# Patient Record
Sex: FEMALE | Race: WHITE | Employment: FULL TIME | URBAN - METROPOLITAN AREA
[De-identification: names, ages, dates, MRNs, and addresses within clinical notes are randomized per-mention and may not be internally consistent; named-entity substitution may affect disease eponyms.]

---

## 2017-01-08 ENCOUNTER — ED HISTORICAL/CONVERTED ENCOUNTER (OUTPATIENT)
Dept: OTHER | Age: 42
End: 2017-01-08

## 2017-01-25 ENCOUNTER — HOSPITAL ENCOUNTER (EMERGENCY)
Age: 42
Discharge: HOME | End: 2017-01-25
Attending: EMERGENCY MEDICINE
Payer: COMMERCIAL

## 2017-01-25 ENCOUNTER — APPOINTMENT (OUTPATIENT)
Dept: GENERAL RADIOLOGY | Age: 42
End: 2017-01-25
Attending: EMERGENCY MEDICINE
Payer: COMMERCIAL

## 2017-01-25 VITALS
SYSTOLIC BLOOD PRESSURE: 148 MMHG | RESPIRATION RATE: 18 BRPM | DIASTOLIC BLOOD PRESSURE: 81 MMHG | BODY MASS INDEX: 39.99 KG/M2 | OXYGEN SATURATION: 97 % | HEART RATE: 91 BPM | HEIGHT: 65 IN | TEMPERATURE: 97.8 F | WEIGHT: 240 LBS

## 2017-01-25 DIAGNOSIS — J20.8 ACUTE BRONCHITIS DUE TO OTHER SPECIFIED ORGANISMS: Primary | ICD-10-CM

## 2017-01-25 DIAGNOSIS — J01.00 ACUTE MAXILLARY SINUSITIS, RECURRENCE NOT SPECIFIED: ICD-10-CM

## 2017-01-25 PROCEDURE — 71020 XR CHEST PA LAT: CPT

## 2017-01-25 PROCEDURE — 99282 EMERGENCY DEPT VISIT SF MDM: CPT

## 2017-01-25 RX ORDER — PREDNISONE 20 MG/1
60 TABLET ORAL DAILY
Qty: 15 TAB | Refills: 0 | Status: SHIPPED | OUTPATIENT
Start: 2017-01-25 | End: 2017-01-30

## 2017-01-25 RX ORDER — ALBUTEROL SULFATE 90 UG/1
2 AEROSOL, METERED RESPIRATORY (INHALATION)
Qty: 1 INHALER | Refills: 0 | Status: SHIPPED | OUTPATIENT
Start: 2017-01-25 | End: 2019-01-02

## 2017-01-25 RX ORDER — AZITHROMYCIN 250 MG/1
TABLET, FILM COATED ORAL
Qty: 6 TAB | Refills: 0 | Status: SHIPPED | OUTPATIENT
Start: 2017-01-25 | End: 2017-01-30

## 2017-01-25 NOTE — DISCHARGE INSTRUCTIONS
Bronchitis: Care Instructions  Your Care Instructions    Bronchitis is inflammation of the bronchial tubes, which carry air to the lungs. The tubes swell and produce mucus, or phlegm. The mucus and inflamed bronchial tubes make you cough. You may have trouble breathing. Most cases of bronchitis are caused by viruses like those that cause colds. Antibiotics usually do not help and they may be harmful. Bronchitis usually develops rapidly and lasts about 2 to 3 weeks in otherwise healthy people. Follow-up care is a key part of your treatment and safety. Be sure to make and go to all appointments, and call your doctor if you are having problems. It's also a good idea to know your test results and keep a list of the medicines you take. How can you care for yourself at home? · Take all medicines exactly as prescribed. Call your doctor if you think you are having a problem with your medicine. · Get some extra rest.  · Take an over-the-counter pain medicine, such as acetaminophen (Tylenol), ibuprofen (Advil, Motrin), or naproxen (Aleve) to reduce fever and relieve body aches. Read and follow all instructions on the label. · Do not take two or more pain medicines at the same time unless the doctor told you to. Many pain medicines have acetaminophen, which is Tylenol. Too much acetaminophen (Tylenol) can be harmful. · Take an over-the-counter cough medicine that contains dextromethorphan to help quiet a dry, hacking cough so that you can sleep. Avoid cough medicines that have more than one active ingredient. Read and follow all instructions on the label. · Breathe moist air from a humidifier, hot shower, or sink filled with hot water. The heat and moisture will thin mucus so you can cough it out. · Do not smoke. Smoking can make bronchitis worse. If you need help quitting, talk to your doctor about stop-smoking programs and medicines. These can increase your chances of quitting for good.   When should you call for help? Call 911 anytime you think you may need emergency care. For example, call if:  · You have severe trouble breathing. Call your doctor now or seek immediate medical care if:  · You have new or worse trouble breathing. · You cough up dark brown or bloody mucus (sputum). · You have a new or higher fever. · You have a new rash. Watch closely for changes in your health, and be sure to contact your doctor if:  · You cough more deeply or more often, especially if you notice more mucus or a change in the color of your mucus. · You are not getting better as expected. Where can you learn more? Go to http://omkar-thanh.info/. Enter H333 in the search box to learn more about \"Bronchitis: Care Instructions. \"  Current as of: May 23, 2016  Content Version: 11.1  © 5438-0030 Figure 1. Care instructions adapted under license by [employer] (which disclaims liability or warranty for this information). If you have questions about a medical condition or this instruction, always ask your healthcare professional. [address] any warranty or liability for your use of this information. Sinusitis: Care Instructions  Your Care Instructions    Sinusitis is an infection of the lining of the sinus cavities in your head. Sinusitis often follows a cold. It causes pain and pressure in your head and face. In most cases, sinusitis gets better on its own in 1 to 2 weeks. But some mild symptoms may last for several weeks. Sometimes antibiotics are needed. Follow-up care is a key part of your treatment and safety. Be sure to make and go to all appointments, and call your doctor if you are having problems. It's also a good idea to know your test results and keep a list of the medicines you take. How can you care for yourself at home? · Take an over-the-counter pain medicine, such as acetaminophen (Tylenol), ibuprofen (Advil, Motrin), or naproxen (Aleve). Read and follow all instructions on the label. · If the doctor prescribed antibiotics, take them as directed. Do not stop taking them just because you feel better. You need to take the full course of antibiotics. · Be careful when taking over-the-counter cold or flu medicines and Tylenol at the same time. Many of these medicines have acetaminophen, which is Tylenol. Read the labels to make sure that you are not taking more than the recommended dose. Too much acetaminophen (Tylenol) can be harmful. · Breathe warm, moist air from a steamy shower, a hot bath, or a sink filled with hot water. Avoid cold, dry air. Using a humidifier in your home may help. Follow the directions for cleaning the machine. · Use saline (saltwater) nasal washes to help keep your nasal passages open and wash out mucus and bacteria. You can buy saline nose drops at a grocery store or drugstore. Or you can make your own at home by adding 1 teaspoon of salt and 1 teaspoon of baking soda to 2 cups of distilled water. If you make your own, fill a bulb syringe with the solution, insert the tip into your nostril, and squeeze gently. [place] Bees your nose. · Put a hot, wet towel or a warm gel pack on your face 3 or 4 times a day for 5 to 10 minutes each time. · Try a decongestant nasal spray like oxymetazoline (Afrin). Do not use it for more than 3 days in a row. Using it for more than 3 days can make your congestion worse. When should you call for help? Call your doctor now or seek immediate medical care if:  · You have new or worse swelling or redness in your face or around your eyes. · You have a new or higher fever. Watch closely for changes in your health, and be sure to contact your doctor if:  · You have new or worse facial pain. · The mucus from your nose becomes thicker (like pus) or has new blood in it. · You are not getting better as expected. Where can you learn more? Go to http://omkar-thanh.info/.   Enter U877 in the search box to learn more about \"Sinusitis: Care Instructions. \"  Current as of: July 29, 2016  Content Version: 11.1  © 8667-8659 [employer]. Care instructions adapted under license by [employer] (which disclaims liability or warranty for this information). If you have questions about a medical condition or this instruction, always ask your healthcare professional. [address] any warranty or liability for your use of this information. We hope that we have addressed all of your medical concerns. The examination and treatment you received in the Emergency Department were for an emergent problem and were not intended as complete care. It is important that you follow up with your healthcare provider(s) for ongoing care. If your symptoms worsen or do not improve as expected, and you are unable to reach your usual health care provider(s), you should return to the Emergency Department. Today's healthcare is undergoing tremendous change, and patient satisfaction surveys are one of the many tools to assess the quality of medical care. You may receive a survey from the [employer] regarding your experience in the Emergency Department. I hope that your experience has been completely positive, particularly the medical care that I provided. As such, please participate in the survey; anything less than excellent does not meet my expectations or intentions. [facility] and [facility] participate in nationally recognized quality of care measures. If your blood pressure is greater than 120/80, as reported below, we urge that you seek medical care to address the potential of high blood pressure, commonly known as hypertension. Hypertension can be hereditary or can be caused by certain medical conditions, pain, stress, or \"white coat syndrome. \"       Please make an appointment with your health care provider(s) for follow up of your Emergency Department visit. VITALS:   Patient Vitals for the past 8 hrs:   Temp Pulse Resp BP SpO2   01/25/17 1154 97.8 °F (36.6 °C) 91 18 148/81 97 %          Thank you for allowing us to provide you with medical care today. We realize that you have many choices for your emergency care needs. Please choose us in the future for any continued health care needs. Regards,           Lowell ADAMS. [id number] [place], [address]: [phone]            No results found for this or any previous visit (from the past 24 hour(s)). Xr Chest Pa Lat    Result Date: 1/25/2017  Indication: Cough, shortness of breath, headache, congestion for several days. Exam: PA and lateral views of the chest. There is no prior study for direct comparison. Findings: Cardiomediastinal silhouette is within normal limits. Lungs are clear bilaterally. Pleural spaces are normal. Osseous structures are intact. IMPRESSION: No acute cardiopulmonary disease.

## 2017-01-25 NOTE — ED PROVIDER NOTES
HPI Comments: 46 y.o. female with past medical history significant for DM who presents to the ED with chief complaint of cough. Pt reports a productive cough with \"greenish phlegm\" (no blood) onset about 2 days ago accompanied by nasal congestion, sinus pain, ear pain, SOB, and wheezing, says it \"hurts to cough and to breathe. \" Pt states she had similar sx about 2 weeks ago that improved and then returned and worsened. Pt denies hx of asthma, COPD, or emphysema. Pt states she has used inhalers before. Pt denies any chance of pregnancy. There are no other acute medical complaints voiced at this time. Social Hx: Smoker. PCP: None    Note written by Bre Guillen, as dictated by Margo Mcgovern MD 12:23 PM     The history is provided by the patient. Past Medical History:   Diagnosis Date    Diabetes [facility])        Past Surgical History:   Procedure Laterality Date    Hx cholecystectomy      Hx knee arthroscopy Left     Hx bunionectomy Bilateral     Hx tubal ligation           No family history on file. Social History     Social History    Marital status: SINGLE     Spouse name: N/A    Number of children: N/A    Years of education: N/A     Occupational History    Not on file. Social History Main Topics    Smoking status: Current Every Day Smoker     Packs/day: 0.50    Smokeless tobacco: Not on file    Alcohol use No    Drug use: Not on file    Sexual activity: Not on file     Other Topics Concern    Not on file     Social History Narrative         ALLERGIES: Latex; Pcn [penicillins]; and Sulfa (sulfonamide antibiotics)    Review of Systems   Constitutional: Negative. Negative for appetite change, fever and unexpected weight change. HENT: Positive for congestion (nasal), ear pain and sinus pressure. Negative for hearing loss, nosebleeds, rhinorrhea, sore throat and trouble swallowing. Respiratory: Positive for cough (productive), shortness of breath and wheezing. Negative for chest tightness. Cardiovascular: Negative. Negative for chest pain and palpitations. Gastrointestinal: Negative. Negative for abdominal distention, abdominal pain, blood in stool and vomiting. Endocrine: Negative. Genitourinary: Negative for dysuria and hematuria. Musculoskeletal: Negative. Negative for back pain and myalgias. Skin: Negative. Negative for rash. Allergic/Immunologic: Negative. Neurological: Negative. Negative for dizziness, syncope, weakness and numbness. Hematological: Negative. Psychiatric/Behavioral: Negative. All other systems reviewed and are negative. Vitals:    01/25/17 1154   BP: 148/81   Pulse: 91   Resp: 18   Temp: 97.8 °F (36.6 °C)   SpO2: 97%   Weight: 108.9 kg (240 lb)   Height: 5' 5\" (1.651 m)            Physical Exam   Constitutional: She is oriented to person, place, and time. She appears well-developed and well-nourished. No distress. Nontoxic appearing. HENT:   Head: Atraumatic. Right Ear: External ear normal.   Left Ear: External ear normal.   Mouth/Throat: Oropharynx is clear and moist.   Nasal congestion. Sinus discomfort to palpation. Ears normal.  Throat normal.   Eyes: Conjunctivae and EOM are normal. Pupils are equal, round, and reactive to light. Neck: Normal range of motion. Neck supple. No JVD present. No thyromegaly present. Cardiovascular: Normal rate, regular rhythm, normal heart sounds and intact distal pulses. No murmur heard. Pulmonary/Chest: Effort normal. No respiratory distress. She has wheezes. She has no rales. Frequent cough. Coarse breath sounds bilaterally with no rales. Occasional expiratory wheezes. No rhonchi. Abdominal: Soft. Bowel sounds are normal. She exhibits no distension. There is no tenderness. Musculoskeletal: Normal range of motion. She exhibits no edema. Neurological: She is alert and oriented to person, place, and time. No cranial nerve deficit.    Skin: Skin is warm and dry. No rash noted. Psychiatric: She has a normal mood and affect. Her behavior is normal. Thought content normal.   Nursing note and vitals reviewed. Note written by Bre Valle, as dictated by Rishabh Araujo MD 12:24 PM    [facility]  ED Course       Procedures    PROGRESS NOTE:  1:22 PM   Chest x-ray negative. Will discharge home with inhaler, Z pack, and prednisone for bronchitis.

## 2017-04-22 ENCOUNTER — ED HISTORICAL/CONVERTED ENCOUNTER (OUTPATIENT)
Dept: OTHER | Age: 42
End: 2017-04-22

## 2017-08-28 ENCOUNTER — OP HISTORICAL/CONVERTED ENCOUNTER (OUTPATIENT)
Dept: OTHER | Age: 42
End: 2017-08-28

## 2017-10-10 ENCOUNTER — OP HISTORICAL/CONVERTED ENCOUNTER (OUTPATIENT)
Dept: OTHER | Age: 42
End: 2017-10-10

## 2017-11-14 ENCOUNTER — OP HISTORICAL/CONVERTED ENCOUNTER (OUTPATIENT)
Dept: OTHER | Age: 42
End: 2017-11-14

## 2017-12-02 ENCOUNTER — IP HISTORICAL/CONVERTED ENCOUNTER (OUTPATIENT)
Dept: OTHER | Age: 42
End: 2017-12-02

## 2018-03-21 ENCOUNTER — ED HISTORICAL/CONVERTED ENCOUNTER (OUTPATIENT)
Dept: OTHER | Age: 43
End: 2018-03-21

## 2018-11-07 ENCOUNTER — OFFICE VISIT (OUTPATIENT)
Dept: NEUROLOGY | Age: 43
End: 2018-11-07

## 2018-11-07 VITALS
WEIGHT: 243.8 LBS | DIASTOLIC BLOOD PRESSURE: 80 MMHG | SYSTOLIC BLOOD PRESSURE: 140 MMHG | HEIGHT: 65 IN | OXYGEN SATURATION: 98 % | BODY MASS INDEX: 40.62 KG/M2 | HEART RATE: 72 BPM

## 2018-11-07 DIAGNOSIS — G43.709 CHRONIC MIGRAINE W/O AURA W/O STATUS MIGRAINOSUS, NOT INTRACTABLE: Primary | ICD-10-CM

## 2018-11-07 DIAGNOSIS — H53.453 DECREASED PERIPHERAL VISION OF BOTH EYES: ICD-10-CM

## 2018-11-07 RX ORDER — METOPROLOL TARTRATE 25 MG/1
TABLET, FILM COATED ORAL 2 TIMES DAILY
COMMUNITY

## 2018-11-07 RX ORDER — TOPIRAMATE 50 MG/1
TABLET, FILM COATED ORAL
Qty: 60 TAB | Refills: 2 | Status: SHIPPED | OUTPATIENT
Start: 2018-11-07 | End: 2019-02-14 | Stop reason: SDUPTHER

## 2018-11-07 RX ORDER — ATORVASTATIN CALCIUM 40 MG/1
TABLET, FILM COATED ORAL DAILY
COMMUNITY

## 2018-11-07 RX ORDER — TOPIRAMATE 25 MG/1
TABLET ORAL
COMMUNITY
End: 2018-11-07 | Stop reason: ALTCHOICE

## 2018-11-07 RX ORDER — FUROSEMIDE 20 MG/1
TABLET ORAL DAILY
COMMUNITY

## 2018-11-07 RX ORDER — ESCITALOPRAM OXALATE 10 MG/1
10 TABLET ORAL DAILY
COMMUNITY
End: 2018-11-07 | Stop reason: SDUPTHER

## 2018-11-07 RX ORDER — LISINOPRIL 10 MG/1
20 TABLET ORAL DAILY
COMMUNITY

## 2018-11-07 RX ORDER — ESCITALOPRAM OXALATE 20 MG/1
20 TABLET ORAL DAILY
Qty: 90 TAB | Refills: 1 | Status: SHIPPED | OUTPATIENT
Start: 2018-11-07 | End: 2019-06-05 | Stop reason: SDUPTHER

## 2018-11-07 NOTE — PATIENT INSTRUCTIONS
A Healthy Lifestyle: Care Instructions  Your Care Instructions    A healthy lifestyle can help you feel good, stay at a healthy weight, and have plenty of energy for both work and play. A healthy lifestyle is something you can share with your whole family. A healthy lifestyle also can lower your risk for serious health problems, such as high blood pressure, heart disease, and diabetes. You can follow a few steps listed below to improve your health and the health of your family. Follow-up care is a key part of your treatment and safety. Be sure to make and go to all appointments, and call your doctor if you are having problems. It's also a good idea to know your test results and keep a list of the medicines you take. How can you care for yourself at home? · Do not eat too much sugar, fat, or fast foods. You can still have dessert and treats now and then. The goal is moderation. · Start small to improve your eating habits. Pay attention to portion sizes, drink less juice and soda pop, and eat more fruits and vegetables. ? Eat a healthy amount of food. A 3-ounce serving of meat, for example, is about the size of a deck of cards. Fill the rest of your plate with vegetables and whole grains. ? Limit the amount of soda and sports drinks you have every day. Drink more water when you are thirsty. ? Eat at least 5 servings of fruits and vegetables every day. It may seem like a lot, but it is not hard to reach this goal. A serving or helping is 1 piece of fruit, 1 cup of vegetables, or 2 cups of leafy, raw vegetables. Have an apple or some carrot sticks as an afternoon snack instead of a candy bar. Try to have fruits and/or vegetables at every meal.  · Make exercise part of your daily routine. You may want to start with simple activities, such as walking, bicycling, or slow swimming. Try to be active 30 to 60 minutes every day. You do not need to do all 30 to 60 minutes all at once.  For example, you can exercise 3 times a day for 10 or 20 minutes. Moderate exercise is safe for most people, but it is always a good idea to talk to your doctor before starting an exercise program.  · Keep moving. [place] the lawn, work in the garden, or [employer]. Take the stairs instead of the elevator at work. · If you smoke, quit. People who smoke have an increased risk for heart attack, stroke, cancer, and other lung illnesses. Quitting is hard, but there are ways to boost your chance of quitting tobacco for good. ? Use nicotine gum, patches, or lozenges. ? Ask your doctor about stop-smoking programs and medicines. ? Keep trying. In addition to reducing your risk of diseases in the future, you will notice some benefits soon after you stop using tobacco. If you have shortness of breath or asthma symptoms, they will likely get better within a few weeks after you quit. · Limit how much alcohol you drink. Moderate amounts of alcohol (up to 2 drinks a day for men, 1 drink a day for women) are okay. But drinking too much can lead to liver problems, high blood pressure, and other health problems. Family health  If you have a family, there are many things you can do together to improve your health. · Eat meals together as a family as often as possible. · Eat healthy foods. This includes fruits, vegetables, lean meats and dairy, and whole grains. · Include your family in your fitness plan. Most people think of activities such as jogging or tennis as the way to fitness, but there are many ways you and your family can be more active. Anything that makes you breathe hard and gets your heart pumping is exercise. Here are some tips:  ? Walk to do errands or to take your child to school or the bus.  ? Go for a family bike ride after dinner instead of watching TV. Where can you learn more? Go to http://omkar-thanh.info/. Enter M580 in the search box to learn more about \"A Healthy Lifestyle: Care Instructions. \"  Current as of: December 7, 2017  Content Version: 11.8  © 4252-3535 [employer]. Care instructions adapted under license by [employer] (which disclaims liability or warranty for this information). If you have questions about a medical condition or this instruction, always ask your healthcare professional. [address] any warranty or liability for your use of this information.

## 2018-11-07 NOTE — LETTER
11/7/2018 Patient:  Niurka Chaevz YOB: 1975 Date of Visit: 11/7/2018 Dear Niraj Xiao NP 
[id number] [facility] [id number] VIA Facsimile: [phone] 
 : 
 
 
I was requested by MARIA C Hughes to evaluate Ms. Niurka Chavez  for Chief Complaint Patient presents with  Neurologic Problem  Migraine Dana Salguero I am recommending the following:  
 
Diagnoses and all orders for this visit: 
 
1. Chronic migraine w/o aura w/o status migrainosus, not intractable 2. Decreased peripheral vision of both eyes Other orders 
-     fremanezumab-vfrm 225 mg/1.5 mL syrg; 1 Syringe by SubCUTAneous route every month. -     topiramate (TOPAMAX) 50 mg tablet; 1/2 tab twice daily for 3 days then increase to 1 tab twice daily thereafter 
-     escitalopram oxalate (LEXAPRO) 20 mg tablet; Take 1 Tab by mouth daily. 
 
 
 
---------------------------------------------------------------------------------------------------------------------- Below is my encounter: Chief Complaint Patient presents with  Neurologic Problem  Migraine Referred by: MARIA C WILSON Shantal is a 82-year-old woman who has a history of migraine headaches and hypertension. She has had migraine headaches ever since age 18 which in the past couple years has become a daily event described as either right or left-sided slightly more on the left pulsing throbbing pain with light and sound sensitivity and worse with movement. When she has severe escalation she has nausea and vomiting. The other reason why she is here is about 3 weeks ago she had sudden changes in her vision bilaterally described as depth changes possibly some hypersensitivity to light. She went to see ophthalmology whose notes I reviewed and she had normal optic discs but they were concerned about a left homonymous hemianopsia, but I do not have any VF tests to review.  She tells me she had MRI brain done last week or so does not know the results. She still has a headache every day. No unusual numbness or weakness. Depression is a concern. She is on Lexapro for depression. She has EL but is not tolerating the NC. Review of Systems Constitutional: Positive for malaise/fatigue. Eyes: Positive for blurred vision. Negative for double vision. Gastrointestinal: Positive for nausea and vomiting. Neurological: Positive for headaches. Negative for sensory change and speech change. Psychiatric/Behavioral: Positive for memory loss. All other systems reviewed and are negative. Past Medical History:  
Diagnosis Date  Headache  Hypertension Family History Problem Relation Age of Onset  Cancer Mother Social History Socioeconomic History  Marital status:  Spouse name: Not on file  Number of children: Not on file  Years of education: Not on file  Highest education level: Not on file Social Needs  Financial resource strain: Not on file  Food insecurity - worry: Not on file  Food insecurity - inability: Not on file  Transportation needs - medical: Not on file  Transportation needs - non-medical: Not on file Occupational History  Not on file Tobacco Use  Smoking status: Current Every Day Smoker  Smokeless tobacco: Never Used Substance and Sexual Activity  Alcohol use: No  
  Frequency: Never  Drug use: Not on file  Sexual activity: Not on file Other Topics Concern  Not on file Social History Narrative  Not on file Current Outpatient Medications Medication Sig  furosemide (LASIX) 20 mg tablet Take  by mouth daily.  lisinopril (PRINIVIL, ZESTRIL) 10 mg tablet Take  by mouth daily.  metoprolol tartrate (LOPRESSOR) 25 mg tablet Take  by mouth two (2) times a day.  atorvastatin (LIPITOR) 40 mg tablet Take  by mouth daily.   
 fremanezumab-vfrm 225 mg/1.5 mL syrg 1 Syringe by SubCUTAneous route every month.  topiramate (TOPAMAX) 50 mg tablet 1/2 tab twice daily for 3 days then increase to 1 tab twice daily thereafter  escitalopram oxalate (LEXAPRO) 20 mg tablet Take 1 Tab by mouth daily. No current facility-administered medications for this visit. Allergies Allergen Reactions  Penicillins Unknown (comments)  Sulfa (Sulfonamide Antibiotics) Unknown (comments) Neurologic Exam  
 
Mental Status Oriented to person, place, and time. Cranial Nerves Cranial nerves II through XII intact. Questionable imp VF BL periperhally, tunnel like Motor Exam  
Muscle bulk: normal 
 
Strength Strength 5/5 throughout. Sensory Exam  
Light touch normal.  
 
Gait, Coordination, and Reflexes Gait Gait: normal 
 
Coordination Romberg: negative Tremor Resting tremor: absent Reflexes Right brachioradialis: 2+ Left brachioradialis: 2+ Right biceps: 2+ Left biceps: 2+ Right triceps: 2+ Left triceps: 2+ Right patellar: 2+ Left patellar: 2+ Right achilles: 2+ Left achilles: 2+ Physical Exam  
Constitutional: She is oriented to person, place, and time. She appears well-developed and well-nourished. Cardiovascular: Normal rate. Pulmonary/Chest: Effort normal.  
Neurological: She is oriented to person, place, and time. She has normal strength. She has a normal Romberg Test. Gait normal.  
Reflex Scores: 
     Tricep reflexes are 2+ on the right side and 2+ on the left side. Bicep reflexes are 2+ on the right side and 2+ on the left side. Brachioradialis reflexes are 2+ on the right side and 2+ on the left side. Patellar reflexes are 2+ on the right side and 2+ on the left side. Achilles reflexes are 2+ on the right side and 2+ on the left side. Skin: Skin is warm and dry. Psychiatric:  
Very flat affect Vitals reviewed. Visit Vitals /80 Pulse 72 Ht 5' 5\" (1.651 m) Wt 110.6 kg (243 lb 12.8 oz) SpO2 98% BMI 40.57 kg/m² No labs to review Assessment and Plan Diagnoses and all orders for this visit: 
 
1. Chronic migraine w/o aura w/o status migrainosus, not intractable 2. Decreased peripheral vision of both eyes Other orders 
-     fremanezumab-vfrm 225 mg/1.5 mL syrg; 1 Syringe by SubCUTAneous route every month. -     topiramate (TOPAMAX) 50 mg tablet; 1/2 tab twice daily for 3 days then increase to 1 tab twice daily thereafter 
-     escitalopram oxalate (LEXAPRO) 20 mg tablet; Take 1 Tab by mouth daily. 46-year-old woman who has chronic migraine. She has daily migraine headache for the past 2 years or longer. She is also suffering from a lot of concomitant depression. The concern for vision loss is vague. No clear field cuts but she does complain of peripheral loss. Will try to obtain the MRI results. She has already been seen by ophthalmology who found normal optic discs. At this point I would like to augment TPM to a more effective 50 mg twice a day. A trial of Ajovy starting today. I want to increase Lexapro to a more therapeutic 20 mg to target headache and depression. She is not suicidal. Try to be more compliant with EL Rx. I would like her to followup in about 8 weeks with a nurse practitioner for a medication benefit assessment. Thank you for giving me the opportunity to assist in the care of Ms. Kojo Fleming. If you have questions, please do not hesitate to contact me. Sincerely, [address] [facility], DO Neurologist 
[place] RAMOS

## 2018-11-07 NOTE — PROGRESS NOTES
Chief Complaint   Patient presents with    Neurologic Problem    Migraine       Referred by: MARIA C Bala Saavedra is a 46-year-old woman who has a history of migraine headaches and hypertension. She has had migraine headaches ever since age 18 which in the past couple years has become a daily event described as either right or left-sided slightly more on the left pulsing throbbing pain with light and sound sensitivity and worse with movement. When she has severe escalation she has nausea and vomiting. The other reason why she is here is about 3 weeks ago she had sudden changes in her vision bilaterally described as depth changes possibly some hypersensitivity to light. She went to see ophthalmology whose notes I reviewed and she had normal optic discs but they were concerned about a left homonymous hemianopsia, but I do not have any VF tests to review. She tells me she had MRI brain done last week or so does not know the results. She still has a headache every day. No unusual numbness or weakness. Depression is a concern. She is on Lexapro for depression. She has EL but is not tolerating the NC. Review of Systems   Constitutional: Positive for malaise/fatigue. Eyes: Positive for blurred vision. Negative for double vision. Gastrointestinal: Positive for nausea and vomiting. Neurological: Positive for headaches. Negative for sensory change and speech change. Psychiatric/Behavioral: Positive for memory loss. All other systems reviewed and are negative.       Past Medical History:   Diagnosis Date    Headache     Hypertension      Family History   Problem Relation Age of Onset    Cancer Mother      Social History     Socioeconomic History    Marital status:      Spouse name: Not on file    Number of children: Not on file    Years of education: Not on file    Highest education level: Not on file   Social Needs    Financial resource strain: Not on file    Food insecurity - worry: Not on file    Food insecurity - inability: Not on file    Transportation needs - medical: Not on file   [employer] needs - non-medical: Not on file   Occupational History    Not on file   Tobacco Use    Smoking status: Current Every Day Smoker    Smokeless tobacco: Never Used   Substance and Sexual Activity    Alcohol use: No     Frequency: Never    Drug use: Not on file    Sexual activity: Not on file   Other Topics Concern    Not on file   Social History Narrative    Not on file     Current Outpatient Medications   Medication Sig    furosemide (LASIX) 20 mg tablet Take  by mouth daily.  lisinopril (PRINIVIL, ZESTRIL) 10 mg tablet Take  by mouth daily.  metoprolol tartrate (LOPRESSOR) 25 mg tablet Take  by mouth two (2) times a day.  atorvastatin (LIPITOR) 40 mg tablet Take  by mouth daily.  fremanezumab-vfrm 225 mg/1.5 mL syrg 1 Syringe by SubCUTAneous route every month.  topiramate (TOPAMAX) 50 mg tablet 1/2 tab twice daily for 3 days then increase to 1 tab twice daily thereafter    escitalopram oxalate (LEXAPRO) 20 mg tablet Take 1 Tab by mouth daily. No current facility-administered medications for this visit. Allergies   Allergen Reactions    Penicillins Unknown (comments)    Sulfa (Sulfonamide Antibiotics) Unknown (comments)         Neurologic Exam     Mental Status   Oriented to person, place, and time. Cranial Nerves   Cranial nerves II through XII intact. Questionable imp VF BL periperhally, tunnel like     Motor Exam   Muscle bulk: normal    Strength   Strength 5/5 throughout.      Sensory Exam   Light touch normal.     Gait, Coordination, and Reflexes     Gait  Gait: normal    Coordination   Romberg: negative    Tremor   Resting tremor: absent    Reflexes   Right brachioradialis: 2+  Left brachioradialis: 2+  Right biceps: 2+  Left biceps: 2+  Right triceps: 2+  Left triceps: 2+  Right patellar: 2+  Left patellar: 2+  Right achilles: 2+  Left achilles: 2+    Physical Exam   Constitutional: She is oriented to person, place, and time. She appears well-developed and well-nourished. Cardiovascular: Normal rate. Pulmonary/Chest: Effort normal.   Neurological: She is oriented to person, place, and time. She has normal strength. She has a normal Romberg Test. Gait normal.   Reflex Scores:       Tricep reflexes are 2+ on the right side and 2+ on the left side. Bicep reflexes are 2+ on the right side and 2+ on the left side. Brachioradialis reflexes are 2+ on the right side and 2+ on the left side. Patellar reflexes are 2+ on the right side and 2+ on the left side. Achilles reflexes are 2+ on the right side and 2+ on the left side. Skin: Skin is warm and dry. Psychiatric:   Very flat affect   Vitals reviewed. Visit Vitals  /80   Pulse 72   Ht 5' 5\" (1.651 m)   Wt 110.6 kg (243 lb 12.8 oz)   SpO2 98%   BMI 40.57 kg/m²       No labs to review       Assessment and Plan   Diagnoses and all orders for this visit:    1. Chronic migraine w/o aura w/o status migrainosus, not intractable    2. Decreased peripheral vision of both eyes    Other orders  -     fremanezumab-vfrm 225 mg/1.5 mL syrg; 1 Syringe by SubCUTAneous route every month. -     topiramate (TOPAMAX) 50 mg tablet; 1/2 tab twice daily for 3 days then increase to 1 tab twice daily thereafter  -     escitalopram oxalate (LEXAPRO) 20 mg tablet; Take 1 Tab by mouth daily. 44-year-old woman who has chronic migraine. She has daily migraine headache for the past 2 years or longer. She is also suffering from a lot of concomitant depression. The concern for vision loss is vague. No clear field cuts but she does complain of peripheral loss. Will try to obtain the MRI results. She has already been seen by ophthalmology who found normal optic discs. At this point I would like to augment TPM to a more effective 50 mg twice a day. A trial of Ajovy starting today.  I want to increase Lexapro to a more therapeutic 20 mg to target headache and depression. She is not suicidal. Try to be more compliant with EL Rx. I would like her to followup in about 8 weeks with a nurse practitioner for a medication benefit assessment. A notice of this visit/encounter being completed has been sent electronically to the patient's PCP and/or referring provider.      [address] [place] [address], [facility] [address]  Diplomate DALEN

## 2018-11-07 NOTE — PROGRESS NOTES
Ms. Jess Jacinto presents as a new patient for evaluation of headaches and decreased vision in both eyes. Her headaches are daily. Depression screening done on patient.

## 2018-11-15 DIAGNOSIS — I69.30 CHRONIC ISCHEMIC RIGHT PCA STROKE: Primary | ICD-10-CM

## 2018-11-19 ENCOUNTER — TELEPHONE (OUTPATIENT)
Dept: NEUROLOGY | Age: 43
End: 2018-11-19

## 2018-12-11 ENCOUNTER — HOSPITAL ENCOUNTER (OUTPATIENT)
Dept: MRI IMAGING | Age: 43
Discharge: HOME | End: 2018-12-11
Attending: PSYCHIATRY & NEUROLOGY
Payer: COMMERCIAL

## 2018-12-11 ENCOUNTER — HOSPITAL ENCOUNTER (OUTPATIENT)
Dept: NON INVASIVE DIAGNOSTICS | Age: 43
Discharge: HOME | End: 2018-12-11
Attending: PSYCHIATRY & NEUROLOGY
Payer: COMMERCIAL

## 2018-12-11 ENCOUNTER — HOSPITAL ENCOUNTER (OUTPATIENT)
Dept: MRI IMAGING | Age: 43
End: 2018-12-11
Attending: PSYCHIATRY & NEUROLOGY
Payer: COMMERCIAL

## 2018-12-11 VITALS — WEIGHT: 245 LBS | BODY MASS INDEX: 40.77 KG/M2

## 2018-12-11 DIAGNOSIS — I69.30 CHRONIC ISCHEMIC RIGHT PCA STROKE: ICD-10-CM

## 2018-12-11 PROCEDURE — 93306 TTE W/DOPPLER COMPLETE: CPT

## 2018-12-11 PROCEDURE — 70544 MR ANGIOGRAPHY HEAD W/O DYE: CPT

## 2018-12-11 PROCEDURE — 70553 MRI BRAIN STEM W/O & W/DYE: CPT

## 2018-12-11 PROCEDURE — 74011250636 HC RX REV CODE- 250/636: Performed by: PSYCHIATRY & NEUROLOGY

## 2018-12-11 PROCEDURE — A9575 INJ GADOTERATE MEGLUMI 0.1ML: HCPCS | Performed by: PSYCHIATRY & NEUROLOGY

## 2018-12-11 RX ORDER — GADOTERATE MEGLUMINE 376.9 MG/ML
20 INJECTION INTRAVENOUS
Status: COMPLETED | OUTPATIENT
Start: 2018-12-11 | End: 2018-12-11

## 2018-12-11 RX ADMIN — GADOTERATE MEGLUMINE 20 ML: 376.9 INJECTION INTRAVENOUS at 15:53

## 2018-12-17 LAB
PROT C ACT/NOR PPP: 111 % (ref 73–180)
PROT C AG ACT/NOR PPP IA: 93 % (ref 60–150)

## 2018-12-18 ENCOUNTER — TELEPHONE (OUTPATIENT)
Dept: NEUROLOGY | Age: 43
End: 2018-12-18

## 2018-12-18 NOTE — TELEPHONE ENCOUNTER
All her MRI scans and blood work looks good. I see evidence of the old stroke in the right side of the brain but nothing new. This is consistent with the MRI she had done at the beginning of November. It seems that the small stroke she had that has healed fine. No bleeding. Echo of her heart is normal too. No PFO to suggest a hole in her heart. I still see some active blood tests that have not been done yet. Please find out if she was able to get all the blood drawn or if she needs to go back to get protein S, factor V Leiden, antiphospholipid level checked.   Thanks

## 2018-12-20 NOTE — TELEPHONE ENCOUNTER
I spoke with pt and reviewed this information with her. I checked with [facility] and they claim they only got order for the one lab test that was done. I printed off the orders for the remaining tests, and after speaking with pt, I mailed them to her so she can have them done.

## 2019-01-02 ENCOUNTER — TELEPHONE (OUTPATIENT)
Dept: NEUROLOGY | Age: 44
End: 2019-01-02

## 2019-01-02 ENCOUNTER — OFFICE VISIT (OUTPATIENT)
Dept: NEUROLOGY | Age: 44
End: 2019-01-02

## 2019-01-02 ENCOUNTER — DOCUMENTATION ONLY (OUTPATIENT)
Dept: NEUROLOGY | Age: 44
End: 2019-01-02

## 2019-01-02 VITALS
DIASTOLIC BLOOD PRESSURE: 90 MMHG | HEIGHT: 65 IN | BODY MASS INDEX: 41.32 KG/M2 | HEART RATE: 74 BPM | WEIGHT: 248 LBS | OXYGEN SATURATION: 98 % | SYSTOLIC BLOOD PRESSURE: 140 MMHG | RESPIRATION RATE: 20 BRPM

## 2019-01-02 DIAGNOSIS — I69.30 CHRONIC ISCHEMIC RIGHT PCA STROKE: ICD-10-CM

## 2019-01-02 DIAGNOSIS — G47.33 OSA (OBSTRUCTIVE SLEEP APNEA): ICD-10-CM

## 2019-01-02 DIAGNOSIS — H53.453 DECREASED PERIPHERAL VISION OF BOTH EYES: ICD-10-CM

## 2019-01-02 DIAGNOSIS — G43.709 CHRONIC MIGRAINE W/O AURA W/O STATUS MIGRAINOSUS, NOT INTRACTABLE: Primary | ICD-10-CM

## 2019-01-02 DIAGNOSIS — R51.9 CHRONIC DAILY HEADACHE: ICD-10-CM

## 2019-01-02 NOTE — PROGRESS NOTES
Ms. Fer Elison is here to follow up migraines. She continues to have daily headaches that last most of th day. Depression screening done on patient.

## 2019-01-02 NOTE — PROGRESS NOTES
Date:            19     Name:  Omar Dumont  :  1975  MRN:  [id number]     PCP:  Jesus Landeros, MARIA C    Chief Complaint   Patient presents with    Migraine         HISTORY OF PRESENT ILLNESS:  Miguelina Sánchez is a 41 y.o., female who presents today for follow up for migraines. She saw Dr. Chapo Santoyo is a new patient in November, reported migraines present since age 18 with increasing frequency over recent years to daily headache. Pain can be right or left-sided, pulsing, throbbing pain with light and sound sensitivity and worse with movement. Nausea and vomiting with severe headaches. She also complained of sudden change in her vision about 3 weeks prior to her visit, ophthalmology noted normal discs but concerned about left homonymous hemianopsia. She has sleep apnea, does not tolerate treatment. She was on topiramate, Dr. Chapo Santoyo increase that and added Ajovy for migraine prevention. She had an outside MRI sent that showed small right PCA infarct, repeat MRI here showed chronic right occipital and cerebellar infarcts. Normal MRV, MRA with no significant stenosis. TTE normal, coagulopathy workup incomplete. She reports no change in her headaches with Ajovy and higher Topamax dose. She still has a daily headache, possibly a little bit less intense. With bad migraines, she throws up. This happens about 3 times a week. She has a lot of spots in her vision, mostly in the left eye. Vision is blurry, there most of the time but worse with bad migraines. She reports that she could not keep her CPAP because of insurance but headaches weren't much better on it. She would take the mask off in her sleep because she has insomnia as well. She was not able to follow-up with pulmonology where she lives in [place], was supposed to come back for device adjustments but could not get an appointment. She endorses depression/anxiety that are poorly controlled. Following with her PCP for that.  She is smoking, less than a pack a day. She has tried to quit cold turkey unsuccessfully, PCP was supposed to send in a prescription to help her but did not do that. She is taking a baby aspirin, started that about one month before her stroke. She has had her coagulopathy labs drawn today so they are not resulted. She does have a h/o DVT 3 years ago after foot surgery, another after hip surgery. She was on warfarin after one of her DVTs, was told that one of her coagulopathy labs was abnormal but does not know what the abnormality was. Had a hard time getting anyone to follow-up on this where she lives in [place]. Prior preventatives:  Botox  Topamax  Metoprolol  Lexapro    Prior abortives:  Excedrin  Ibupofren   Tylenol    11.7.2018 john Murguia is a 53-year-old woman who has a history of migraine headaches and hypertension. She has had migraine headaches ever since age 18 which in the past couple years has become a daily event described as either right or left-sided slightly more on the left pulsing throbbing pain with light and sound sensitivity and worse with movement. When she has severe escalation she has nausea and vomiting. The other reason why she is here is about 3 weeks ago she had sudden changes in her vision bilaterally described as depth changes possibly some hypersensitivity to light. She went to see ophthalmology whose notes I reviewed and she had normal optic discs but they were concerned about a left homonymous hemianopsia, but I do not have any VF tests to review. She tells me she had MRI brain done last week or so does not know the results. She still has a headache every day. No unusual numbness or weakness. Depression is a concern. She is on Lexapro for depression. She has EL but is not tolerating the NC. Current Outpatient Medications   Medication Sig    furosemide (LASIX) 20 mg tablet Take  by mouth daily.  lisinopril (PRINIVIL, ZESTRIL) 10 mg tablet Take  by mouth daily.     metoprolol tartrate (LOPRESSOR) 25 mg tablet Take  by mouth two (2) times a day.  atorvastatin (LIPITOR) 40 mg tablet Take  by mouth daily.  fremanezumab-vfrm 225 mg/1.5 mL syrg 1 Syringe by SubCUTAneous route every month.  topiramate (TOPAMAX) 50 mg tablet 1/2 tab twice daily for 3 days then increase to 1 tab twice daily thereafter    escitalopram oxalate (LEXAPRO) 20 mg tablet Take 1 Tab by mouth daily.  Omeprazole delayed release (PRILOSEC D/R) 20 mg tablet Take 20 mg by mouth daily.  acetaminophen (TYLENOL EXTRA STRENGTH) 500 mg tablet Take 1,000 mg by mouth every six (6) hours as needed for Pain. No current facility-administered medications for this visit.       Allergies   Allergen Reactions    Latex Swelling     RASH and THROAT SWELLING      Pcn [Penicillins] Swelling     Eyes swell shut      Penicillins Unknown (comments)    Sulfa (Sulfonamide Antibiotics) Swelling     Eyes swell shut      Sulfa (Sulfonamide Antibiotics) Unknown (comments)     Past Medical History:   Diagnosis Date    Diabetes ([facility] [address]     Headache     Hypertension      Past Surgical History:   Procedure Laterality Date    HX BUNIONECTOMY Bilateral     HX CHOLECYSTECTOMY      HX GI      HX KNEE ARTHROSCOPY Left     HX ORTHOPAEDIC      HX TUBAL LIGATION       Social History     Socioeconomic History    Marital status:      Spouse name: Not on file    Number of children: Not on file    Years of education: Not on file    Highest education level: Not on file   Social Needs    Financial resource strain: Not on file    Food insecurity - worry: Not on file    Food insecurity - inability: Not on file   [employer] needs - medical: Not on file   [employer] needs - non-medical: Not on file   Occupational History    Not on file   Tobacco Use    Smoking status: Current Every Day Smoker     Packs/day: 0.50    Smokeless tobacco: Never Used   Substance and Sexual Activity    Alcohol use: No     Frequency: Never    Drug use: Not on file    Sexual activity: Not on file   Other Topics Concern    Not on file   Social History Narrative    ** Merged History Encounter **          Family History   Problem Relation Age of Onset    Cancer Mother          PHYSICAL EXAMINATION:    Visit Vitals  /90   Pulse 74   Resp 20   Ht 5' 5\" (1.651 m)   Wt 112.5 kg (248 lb)   SpO2 98%   BMI 41.27 kg/m²     General:  Well defined, obese, and groomed individual in no acute distress. Neck: Supple, nontender, no bruits. Heart: Regular rate and rhythm, no murmurs, rub, or gallop. Normal S1S2. Lungs:  Clear to auscultation bilaterally with equal chest expansion, no cough, no wheeze  Musculoskeletal:  Extremities revealed no edema and had full range of motion of joints. Psych:  Good mood and bright affect    NEUROLOGICAL EXAMINATION:     Mental Status:   Alert and oriented to person, place, and time with recent and remote memory intact. Attention span and concentration are normal. Speech is fluent with a full fund of knowledge. Cranial Nerves:    II, III, IV, VI:  Visual acuity grossly intact. Extra-ocular movements are full and fluid. No ptosis or nystagmus. V-XII: Hearing is grossly intact. Facial features are symmetric, with normal sensation and strength. The palate rises symmetrically and the tongue protrudes midline. Sternocleidomastoids 5/5. Motor Examination: Normal tone, bulk, and strength, 5/5 muscle strength throughout. Coordination:  Finger to nose testing was normal.   No resting or intention tremor  Gait and Station:  Steady while walking. Normal arm swing. No pronator drift. No muscle wasting or fasciculations noted. ASSESSMENT AND PLAN    ICD-10-CM ICD-9-CM    1. Chronic migraine w/o aura w/o status migrainosus, not intractable G43.709 346.70    2. EL (obstructive sleep apnea) G47.33 327.23 SLEEP MEDICINE REFERRAL   3. Chronic ischemic right PCA stroke I69.30 V12.54    4. Decreased peripheral vision of both eyes H53.453 368.44    5. Chronic daily headache R51 66.0      46-year-old female seen in follow-up for headaches. She has had migraines since age 18, progression to daily migraine over the past 2 years. She also developed some visual changes, MRI showed chronic right PCA infarct. She has a history of DVT x2, was on warfarin at some point and thinks that she had some sort of abnormal coagulopathy labs but does not know what it was. We will request those records. No improvement in her headaches on topiramate and Ajovy, already on a beta blocker which is also not helping. She is having a hard time quitting smoking, PCP is supposed to send in a prescription for this. 1.  Continue current migraine preventatives for now  2. We will obtain PA for Botox 155 units q. 12 weeks for chronic migraine protocol for preventative as she has failed multiple other preventative strategies  3. Discussed potential underlying causes of her daily headache including untreated sleep apnea and insomnia (referred to sleep medicine here), depression and anxiety (plans to follow with her PCP), tobacco abuse (plans to follow with her PCP). She will continue working on these and knows that preventatives may not be effective until they are dressed  4. We will request records from [facility] where she was admitted for DVT and may have had some coagulopathy, also waiting for coagulopathy labs here. If those are all normal, should escalate aspirin 81 mg to Plavix. If abnormal, will refer to hematology for management. Continue 81 mg aspirin for now  5. Continue to monitor blood sugar, blood pressure, cholesterol and treat as needed with PCP for secondary stroke prevention    Follow-up once Botox is approved, call sooner with concerns    Ernesto Matta NP    This note was created using voice recognition software. Despite editing, there may be syntax errors.

## 2019-01-02 NOTE — PROGRESS NOTES
Requested ER/admission records from 2015 or 2016 for visit for DVT to [facility].

## 2019-01-02 NOTE — TELEPHONE ENCOUNTER
Pt saw NP today and wants to go back on Botox. Will need a printed script to give to Tabitha to start PA process.

## 2019-01-02 NOTE — PATIENT INSTRUCTIONS
A Healthy Lifestyle: Care Instructions  Your Care Instructions    A healthy lifestyle can help you feel good, stay at a healthy weight, and have plenty of energy for both work and play. A healthy lifestyle is something you can share with your whole family. A healthy lifestyle also can lower your risk for serious health problems, such as high blood pressure, heart disease, and diabetes. You can follow a few steps listed below to improve your health and the health of your family. Follow-up care is a key part of your treatment and safety. Be sure to make and go to all appointments, and call your doctor if you are having problems. It's also a good idea to know your test results and keep a list of the medicines you take. How can you care for yourself at home? · Do not eat too much sugar, fat, or fast foods. You can still have dessert and treats now and then. The goal is moderation. · Start small to improve your eating habits. Pay attention to portion sizes, drink less juice and soda pop, and eat more fruits and vegetables. ? Eat a healthy amount of food. A 3-ounce serving of meat, for example, is about the size of a deck of cards. Fill the rest of your plate with vegetables and whole grains. ? Limit the amount of soda and sports drinks you have every day. Drink more water when you are thirsty. ? Eat at least 5 servings of fruits and vegetables every day. It may seem like a lot, but it is not hard to reach this goal. A serving or helping is 1 piece of fruit, 1 cup of vegetables, or 2 cups of leafy, raw vegetables. Have an apple or some carrot sticks as an afternoon snack instead of a candy bar. Try to have fruits and/or vegetables at every meal.  · Make exercise part of your daily routine. You may want to start with simple activities, such as walking, bicycling, or slow swimming. Try to be active 30 to 60 minutes every day. You do not need to do all 30 to 60 minutes all at once.  For example, you can exercise 3 times a day for 10 or 20 minutes. Moderate exercise is safe for most people, but it is always a good idea to talk to your doctor before starting an exercise program.  · Keep moving. Robbie Tan the lawn, work in the garden, or [employer]. Take the stairs instead of the elevator at work. · If you smoke, quit. People who smoke have an increased risk for heart attack, stroke, cancer, and other lung illnesses. Quitting is hard, but there are ways to boost your chance of quitting tobacco for good. ? Use nicotine gum, patches, or lozenges. ? Ask your doctor about stop-smoking programs and medicines. ? Keep trying. In addition to reducing your risk of diseases in the future, you will notice some benefits soon after you stop using tobacco. If you have shortness of breath or asthma symptoms, they will likely get better within a few weeks after you quit. · Limit how much alcohol you drink. Moderate amounts of alcohol (up to 2 drinks a day for men, 1 drink a day for women) are okay. But drinking too much can lead to liver problems, high blood pressure, and other health problems. Family health  If you have a family, there are many things you can do together to improve your health. · Eat meals together as a family as often as possible. · Eat healthy foods. This includes fruits, vegetables, lean meats and dairy, and whole grains. · Include your family in your fitness plan. Most people think of activities such as jogging or tennis as the way to fitness, but there are many ways you and your family can be more active. Anything that makes you breathe hard and gets your heart pumping is exercise. Here are some tips:  ? Walk to do errands or to take your child to school or the bus.  ? Go for a family bike ride after dinner instead of watching TV. Where can you learn more? Go to http://omkar-thanh.info/. Enter X746 in the search box to learn more about \"A Healthy Lifestyle: Care Instructions. \"  Current as of: December 7, 2017  Content Version: 11.8  © 3113-9180 [employer]. Care instructions adapted under license by [employer] (which disclaims liability or warranty for this information). If you have questions about a medical condition or this instruction, always ask your healthcare professional. [address] any warranty or liability for your use of this information.

## 2019-01-03 ENCOUNTER — DOCUMENTATION ONLY (OUTPATIENT)
Dept: NEUROLOGY | Age: 44
End: 2019-01-03

## 2019-01-07 ENCOUNTER — TELEPHONE (OUTPATIENT)
Dept: NEUROLOGY | Age: 44
End: 2019-01-07

## 2019-01-07 NOTE — TELEPHONE ENCOUNTER
Re: Botox     PA submitted via CMM to [employer]. Pending status:  \"[employer] is reviewing your PA request. Typically an electronic response will be received within 72 hours. To check for an update later, open this request from your dashboard. \"      Will process [id number] PA once approval is received for .

## 2019-01-08 NOTE — TELEPHONE ENCOUNTER
Re: Botox    Approval received from [employer].  approved. Auth # [id number]. Auth good 1/7/19 - 4/7/19. No PA required for [address] per Chelsea Wilson @ [facility]. Call reference # [id number]. [facility] is [place]. Phone # is [phone]. Forward to nurse.

## 2019-01-19 LAB
APCR PPP: 2.5 RATIO
APTT HEX PL PPP: 14 SEC
APTT IMM NP PPP: ABNORMAL SEC
APTT PPP 1:1 SALINE: ABNORMAL SEC
APTT PPP: 29.1 SEC
AT III ACT/NOR PPP CHRO: 102 %
B2 GLYCOPROT1 IGA SER-ACNC: <10 SAU
B2 GLYCOPROT1 IGG SER-ACNC: <10 SGU
B2 GLYCOPROT1 IGM SER-ACNC: <10 SMU
CARDIOLIPIN IGA SER IA-ACNC: <10 APL
CARDIOLIPIN IGG SER IA-ACNC: <10 GPL
CARDIOLIPIN IGM SER IA-ACNC: 10 MPL
COMMENTS: 500626: ABNORMAL
CONFIRM DRVVT: ABNORMAL SEC
DRVVT SCREEN TO CONFIRM RATIO: ABNORMAL RATIO
F2 GENE MUT ANL BLD/T: ABNORMAL
F5 GENE MUT ANL BLD/T: NORMAL
FACT VII AG ACT/NOR PPP IA: 107 %
FACT VIII ACT/NOR PPP: 171 %
GENE DIS ANL INTERP-IMP: ABNORMAL
HCYS SERPL-SCNC: 14.6 UMOL/L
LA NT PLATELET PPP: 0.3 SEC
LA PPP-IMP: ABNORMAL
PATH INTERP BLD-IMP: ABNORMAL
PROT C AG ACT/NOR PPP IA: 84 %
PROT C AG/FACT VII AG PPP IA: 0.8 RATIO
PROT S ACT/NOR PPP: 72 % (ref 63–140)
PROT S AG ACT/NOR PPP IA: 82 % (ref 60–150)
PROT S AG ACT/NOR PPP IA: 87 %
PROT S FREE AG ACT/NOR PPP IA: 84 % (ref 57–157)
PROTEIN S AG/COAGULATION FACTOR VII AG [MASS RATIO] IN PLATELET POOR PLASMA BY COAGULATION ASSAY: 0.8 RATIO
PROTHROM IGG SERPL-ACNC: 12 G UNITS
PS IGG SER IA-ACNC: 3 GPS
PS IGM SER IA-ACNC: 1 MPS
REF LAB TEST METHOD: ABNORMAL
SCREEN DRVVT: 42.5 SEC

## 2019-01-22 ENCOUNTER — TELEPHONE (OUTPATIENT)
Dept: NEUROLOGY | Age: 44
End: 2019-01-22

## 2019-01-22 DIAGNOSIS — I63.9 RECURRENT STROKES (HCC): ICD-10-CM

## 2019-01-22 DIAGNOSIS — D68.59 HYPERCOAGULABLE STATE (HCC): Primary | ICD-10-CM

## 2019-01-22 NOTE — TELEPHONE ENCOUNTER
I reviewed her blood work looking for blood clotting abnormalities. Some of the labs are abnormal and because of that I would like her to see a hematologist who is a blood specialist to further help guide what we should do about this. It could be the reason why she is had her stroke. Stay on baby aspirin for now. I will place a referral to hematologist and whoever she sees we will need to send the blood work we have.

## 2019-01-24 ENCOUNTER — ED HISTORICAL/CONVERTED ENCOUNTER (OUTPATIENT)
Dept: OTHER | Age: 44
End: 2019-01-24

## 2019-01-28 ENCOUNTER — TELEPHONE (OUTPATIENT)
Dept: NEUROLOGY | Age: 44
End: 2019-01-28

## 2019-01-31 ENCOUNTER — DOCUMENTATION ONLY (OUTPATIENT)
Dept: NEUROLOGY | Age: 44
End: 2019-01-31

## 2019-02-05 ENCOUNTER — OFFICE VISIT (OUTPATIENT)
Dept: SLEEP MEDICINE | Age: 44
End: 2019-02-05

## 2019-02-05 VITALS
DIASTOLIC BLOOD PRESSURE: 78 MMHG | HEIGHT: 65 IN | WEIGHT: 242 LBS | HEART RATE: 71 BPM | BODY MASS INDEX: 40.32 KG/M2 | OXYGEN SATURATION: 93 % | SYSTOLIC BLOOD PRESSURE: 118 MMHG

## 2019-02-05 DIAGNOSIS — Z86.73 H/O: STROKE: ICD-10-CM

## 2019-02-05 DIAGNOSIS — R42 DIZZINESS: ICD-10-CM

## 2019-02-05 DIAGNOSIS — G47.33 OSA (OBSTRUCTIVE SLEEP APNEA): Primary | ICD-10-CM

## 2019-02-05 DIAGNOSIS — R03.1 LOW BLOOD PRESSURE READING: ICD-10-CM

## 2019-02-05 RX ORDER — ASPIRIN 81 MG/1
TABLET ORAL DAILY
COMMUNITY

## 2019-02-05 NOTE — PATIENT INSTRUCTIONS
[address] [place]. [place], [address]  Tel.  [phone]  Fax. 100 [facility] [address]  [place], [address] [facility]  Tel.  [phone]  Fax. [phone] [address]  Tel.  [phone]  Fax. [phone]     Sleep Apnea: After Your Visit  Your Care Instructions  Sleep apnea occurs when you frequently stop breathing for 10 seconds or longer during sleep. It can be mild to severe, based on the number of times per hour that you stop breathing or have slowed breathing. Blocked or narrowed airways in your nose, mouth, or throat can cause sleep apnea. Your airway can become blocked when your throat muscles and tongue relax during sleep. Sleep apnea is common, occurring in 1 out of 20 individuals. Individuals having any of the following characteristics should be evaluated and treated right away due to high risk and detrimental consequences from untreated sleep apnea:  1. Obesity  2. Congestive Heart failure  3. Atrial Fibrillation  4. Uncontrolled Hypertension  5. Type II Diabetes  6. Night-time Arrhythmias  7. Stroke  8. Pulmonary Hypertension  9. High-risk Driving Populations (pilots, truck drivers, etc.)  10. Patients Considering Weight-loss Surgery    How do you know you have sleep apnea? You probably have sleep apnea if you answer 'yes' to 3 or more of the following questions:  S - Have you been told that you Snore? T - Are you often Tired during the day? O - Has anyone Observed you stop breathing while sleeping? P- Do you have (or are being treated for) high blood Pressure? B - Are you obese (Body Mass Index > 35)? A - Is your Age 48years old or older? N - Is your Neck size greater than 16 inches? G - Are you male Gender? A sleep physician can prescribe a breathing device that prevents tissues in the throat from blocking your airway.  Or your doctor may recommend using a dental device (oral breathing device) to help keep your airway open. In some cases, surgery may be needed to remove enlarged tissues in the throat. Follow-up care is a key part of your treatment and safety. Be sure to make and go to all appointments, and call your doctor if you are having problems. It's also a good idea to know your test results and keep a list of the medicines you take. How can you care for yourself at home? · Lose weight, if needed. It may reduce the number of times you stop breathing or have slowed breathing. · Go to bed at the same time every night. · Sleep on your side. It may stop mild apnea. If you tend to roll onto your back, sew a pocket in the back of your pajama top. Put a tennis ball into the pocket, and stitch the pocket shut. This will help keep you from sleeping on your back. · Avoid alcohol and medicines such as sleeping pills and sedatives before bed. · Do not smoke. Smoking can make sleep apnea worse. If you need help quitting, talk to your doctor about stop-smoking programs and medicines. These can increase your chances of quitting for good. · Prop up the head of your bed 4 to 6 inches by putting bricks under the legs of the bed. · Treat breathing problems, such as a stuffy nose, caused by a cold or allergies. · Use a continuous positive airway pressure (CPAP) breathing machine if lifestyle changes do not help your apnea and your doctor recommends it. The machine keeps your airway from closing when you sleep. · If CPAP does not help you, ask your doctor whether you should try other breathing machines. A bilevel positive airway pressure machine has two types of air pressureâone for breathing in and one for breathing out. Another device raises or lowers air pressure as needed while you breathe. · If your nose feels dry or bleeds when using one of these machines, talk with your doctor about increasing moisture in the air. A humidifier may help.   · If your nose is runny or stuffy from using a breathing machine, talk with your doctor about using decongestants or a corticosteroid nasal spray. When should you call for help? Watch closely for changes in your health, and be sure to contact your doctor if:  · You still have sleep apnea even though you have made lifestyle changes. · You are thinking of trying a device such as CPAP. · You are having problems using a CPAP or similar machine. Where can you learn more? Go to [employer]. Enter V289 in the search box to learn more about \"Sleep Apnea: After Your Visit. \"   © 3086-5045 Healthwise, Incorporated. Care instructions adapted under license by Counts include 234 beds at the [facility] (which disclaims liability or warranty for this information). This care instruction is for use with your licensed healthcare professional. If you have questions about a medical condition or this instruction, always ask your healthcare professional.  Chain any warranty or liability for your use of this information. PROPER SLEEP HYGIENE    What to avoid  · Do not have drinks with caffeine, such as coffee or black tea, for 8 hours before bed. · Do not smoke or use other types of tobacco near bedtime. Nicotine is a stimulant and can keep you awake. · Avoid drinking alcohol late in the evening, because it can cause you to wake in the middle of the night. · Do not eat a big meal close to bedtime. If you are hungry, eat a light snack. · Do not drink a lot of water close to bedtime, because the need to urinate may wake you up during the night. · Do not read or watch TV in bed. Use the bed only for sleeping and sexual activity. What to try  · Go to bed at the same time every night, and wake up at the same time every morning. Do not take naps during the day. · Keep your bedroom quiet, dark, and cool. · Get regular exercise, but not within 3 to 4 hours of your bedtime. .  · Sleep on a comfortable pillow and mattress.   · If watching the clock makes you anxious, turn it facing away from you so you cannot see the time. · If you worry when you lie down, start a worry book. Well before bedtime, write down your worries, and then set the book and your concerns aside. · Try meditation or other relaxation techniques before you go to bed. · If you cannot fall asleep, get up and go to another room until you feel sleepy. Do something relaxing. Repeat your bedtime routine before you go to bed again. · Make your house quiet and calm about an hour before bedtime. Turn down the lights, turn off the TV, log off the computer, and turn down the volume on music. This can help you relax after a busy day. Drowsy Driving  The [address] [place] [address] Road Traffic Safety Administration cites drowsiness as a causing factor in more than 791,253 police reported crashes annually, resulting in 76,000 injuries and 1,500 deaths. Other surveys suggest 55% of people polled have driven while drowsy in the past year, 23% had fallen asleep but not crashed, 3% crashed, and 2% had and accident due to drowsy driving. Who is at risk? Young Drivers: One study of drowsy driving accidents states that 55% of the drivers were under 25 years. Of those, 75% were male. Shift Workers and Travelers: People who work overnight or travel across time zones frequently are at higher risk of experiencing Circadian Rhythm Disorders. They are trying to work and function when their body is programed to sleep. Sleep Deprived: Lack of sleep has a serious impact on your ability to pay attention or focus on a task. Consistently getting less than the average of 8 hours your body needs creates partial or cumulative sleep deprivation. Untreated Sleep Disorders: Sleep Apnea, Narcolepsy, R.L.S., and other sleep disorders (untreated) prevent a person from getting enough restful sleep. This leads to excessive daytime sleepiness and increases the risk for drowsy driving accidents by up to 7 times.   Medications / Alcohol: Even over the counter medications can cause drowsiness. Medications that impair a drivers attention should have a warning label. Alcohol naturally makes you sleepy and on its own can cause accidents. Combined with excessive drowsiness its effects are amplified. Signs of Drowsy Driving:   * You don't remember driving the last few miles   * You may drift out of your hector   * You are unable to focus and your thoughts wander   * You may yawn more often than normal   * You have difficulty keeping your eyes open / nodding off   * Missing traffic signs, speeding, or tailgating  Prevention-   Good sleep hygiene, lifestyle and behavioral choices have the most impact on drowsy driving. There is no substitute for sleep and the average person requires 8 hours nightly. If you find yourself driving drowsy, stop and sleep. Consider the sleep hygiene tips provided during your visit as well. Medication Refill Policy: Refills for all medications require 1 week advance notice. Please have your pharmacy fax a refill request. We are unable to fax, or call in \"controled substance\" medications and you will need to pick these prescriptions up from our office. [employer] Activation    Thank you for requesting access to [employer]. Please follow the instructions below to securely access and download your online medical record. [employer] allows you to send messages to your doctor, view your test results, renew your prescriptions, schedule appointments, and more. How Do I Sign Up? 1. In your internet browser, go to [email or web address]. 2. Click on the First Time User? Click Here link in the Sign In box. You will see the New Member Sign Up page. 3. Enter your [employer] Access Code exactly as it appears below. You will not need to use this code after youve completed the sign-up process. If you do not sign up before the expiration date, you must request a new code.     [employer] Access Code: [email or web address]  Expires: 3/22/2019 11:19 AM (This is the date your ZAP access code will )    4. Enter the last four digits of your Social Security Number (xxxx) and Date of Birth (mm/dd/yyyy) as indicated and click Submit. You will be taken to the next sign-up page. 5. Create a [employer] ID. This will be your ZAP login ID and cannot be changed, so think of one that is secure and easy to remember. 6. Create a ZAP password. You can change your password at any time. 7. Enter your Password Reset Question and Answer. This can be used at a later time if you forget your password. 8. Enter your e-mail address. You will receive e-mail notification when new information is available in [address] 9. Click Sign Up. You can now view and download portions of your medical record. 10. Click the Download Summary menu link to download a portable copy of your medical information. Additional Information    If you have questions, please call [phone]. Remember, ZAP is NOT to be used for urgent needs. For medical emergencies, dial 911.

## 2019-02-05 NOTE — PROGRESS NOTES
[address] [facility] [place]. [place], [address]  Tel.  [phone]  Fax. 100 [facility] 60  [place], [address] [facility]  Tel.  [phone]  Fax. [phone] [address]  Tel.  [phone]  Fax. [phone]         Subjective:      Erick Kim is an 41 y.o. female referred for evaluation for a sleep disorder. She complains of snoring associated with periods of not breathing and past diagnosis of EL. Symptoms began several years ago, gradually worsening since that time. She usually can fall asleep in 1 - 2 hours after getting in bed and during this time she toss an turns in bed. Family or house members note snoring, periods of not breathing. She denies completely or partially paralyzed while falling asleep or waking up. Erick Kim does wake up frequently at night. She is bothered by waking up too early and left unable to get back to sleep. She actually sleeps about 4 hours at night and wakes up about 6 times during the night. She does work shifts:  First Shift;Second Shift; Third Shift; Other Shift. Shantal indicates she does get too little sleep at night. Her bedtime is 9:00 pm when working morning shift; 1:00 am after evening and noon after night shift. She awakens after 4-5 hours after sleep onset. She does take naps. She takes 3-4 naps a week lasting 2 hours. She has the following observed behaviors: Loud snoring, Light snoring, Pauses in breathing; Nightmares. Other remarks: Vivid dreams    [place] Sleepiness Score: 10 which reflect moderate daytime drowsiness.     Allergies   Allergen Reactions    Latex Swelling     RASH and THROAT SWELLING      Pcn [Penicillins] Swelling     Eyes swell shut      Penicillins Unknown (comments)    Sulfa (Sulfonamide Antibiotics) Swelling     Eyes swell shut      Sulfa (Sulfonamide Antibiotics) Unknown (comments)         Current Outpatient Medications:     aspirin delayed-release 81 mg tablet, Take by mouth daily. , Disp: , Rfl:     furosemide (LASIX) 20 mg tablet, Take  by mouth daily. , Disp: , Rfl:     lisinopril (PRINIVIL, ZESTRIL) 10 mg tablet, Take  by mouth daily. , Disp: , Rfl:     metoprolol tartrate (LOPRESSOR) 25 mg tablet, Take  by mouth two (2) times a day., Disp: , Rfl:     atorvastatin (LIPITOR) 40 mg tablet, Take  by mouth daily. , Disp: , Rfl:     fremanezumab-vfrm 225 mg/1.5 mL syrg, 1 Syringe by SubCUTAneous route every month., Disp: 1 Syringe, Rfl: 11    topiramate (TOPAMAX) 50 mg tablet, 1/2 tab twice daily for 3 days then increase to 1 tab twice daily thereafter, Disp: 60 Tab, Rfl: 2    escitalopram oxalate (LEXAPRO) 20 mg tablet, Take 1 Tab by mouth daily. , Disp: 90 Tab, Rfl: 1    onabotulinumtoxinA (BOTOX) 200 unit injection, Inject selected muscles head and neck bilaterally every 12 weeks, Disp: 200 Units, Rfl: 3    Omeprazole delayed release (PRILOSEC D/R) 20 mg tablet, Take 20 mg by mouth daily. , Disp: , Rfl:     acetaminophen (TYLENOL EXTRA STRENGTH) 500 mg tablet, Take 1,000 mg by mouth every six (6) hours as needed for Pain., Disp: , Rfl:      She  has a past medical history of Diabetes ([address] Headache, and Hypertension. She  has a past surgical history that includes hx cholecystectomy; hx knee arthroscopy (Left); hx bunionectomy (Bilateral); hx tubal ligation; hx gi; and hx orthopaedic. She family history includes Cancer in her mother. She  reports that she has been smoking. She has been smoking about 0.50 packs per day. she has never used smokeless tobacco. She reports that she does not drink alcohol.      Review of Systems:  Constitutional:  No significant weight loss or weight gain  Eyes:  Loss of peripheral vision - left eye following stroke  CVS:  significant chest pain - heart flutters a lot, followed by cardiology  Pulm:  No significant shortness of breath  GI:  No significant nausea or vomiting  :  significant nocturia  Musculoskeletal:  significant joint pain at night  Skin:  No significant rashes  Neuro:  significant dizziness - worse at present   Psych:  No active mood issues    Sleep Review of Systems: notable for difficulty falling asleep; frequent awakenings at night;  regular dreaming noted; nightmares ;  early morning headaches; memory problems; concentration issues; no history of any automobile or occupational accidents due to daytime drowsiness. Objective:     Visit Vitals  /78   Pulse 71   Ht 5' 5\" (1.651 m)   Wt 242 lb (109.8 kg)   SpO2 93%   BMI 40.27 kg/m²         General:   Not in acute distress   Eyes:  Anicteric sclerae, no obvious strabismus   Nose:  No obvious nasal septum deviation    Oropharynx:   Class 4 oropharyngeal outlet, thick tongue base, uvula could not be seen due to low-lying soft palate, narrow tonsilo-pharyngeal pilars   Tonsils:   tonsils are not seen due to low-lying soft palate   Neck:   Neck circ. in \"inches\": 15; midline trachea   Chest/Lungs:  Equal lung expansion, clear on auscultation    CVS:  Normal rate, regular rhythm; no JVD   Skin:  Warm to touch; no obvious rashes   Neuro:  No focal deficits ; no obvious tremor    Psych:  Normal affect,  normal countenance;          Assessment:       ICD-10-CM ICD-9-CM    1. EL (obstructive sleep apnea) G47.33 327.23 POLYSOMNOGRAPHY 1 NIGHT   2. Low blood pressure reading R03.1 796.3    3. BMI 40.0-44.9, adult ([facility] Z68.41 V85.41    4. H/O: stroke Z86.73 V12.54    5. Dizziness R42 780.4          Plan:     * The patient currently has a Moderate Risk for having sleep apnea. STOP-BANG score 5.  * Sleep testing was ordered for initial evaluation. * She was provided information on sleep apnea including coresponding risk factors and the importance of proper treatment. * Treatment options if indicated were reviewed today. Patient agrees to a trial of PAP therapy if indicated.   * Counseling was provided regarding proper sleep hygiene (including effect of light on sleep), paradoxical intention, stimulus control, sleep environment safety and safe driving. * Effect of sleep disturbance on weight was reviewed. We have recommended a dedicated weight loss through appropriate diet and an exercise regiment as significant weight reduction has been shown to reduce severity of obstructive sleep apnea. * Patient agrees to telephone [phone]  follow-up by myself or lead sleep technologist shortly after sleep study to review results and plan final management.     (patient has given permission for a message to be left regarding test results and further management if patient cannot be cannot be reached directly). * A referral to the emergency department was recommended due to initial low blood pressure, dizziness and report of a mild headache but declined by patient. Thank you for allowing us to participate in your patient's medical care. We'll keep you updated on these investigations. Gwendolyn Barreto MD, FAASM  Electronically signed.  02/05/19

## 2019-02-14 ENCOUNTER — OFFICE VISIT (OUTPATIENT)
Dept: NEUROLOGY | Age: 44
End: 2019-02-14

## 2019-02-14 VITALS
SYSTOLIC BLOOD PRESSURE: 118 MMHG | RESPIRATION RATE: 18 BRPM | HEIGHT: 65 IN | DIASTOLIC BLOOD PRESSURE: 70 MMHG | OXYGEN SATURATION: 97 % | BODY MASS INDEX: 40.32 KG/M2 | WEIGHT: 242 LBS | HEART RATE: 82 BPM

## 2019-02-14 DIAGNOSIS — G43.719 INTRACTABLE CHRONIC MIGRAINE WITHOUT AURA AND WITHOUT STATUS MIGRAINOSUS: Primary | ICD-10-CM

## 2019-02-14 RX ORDER — TOPIRAMATE 50 MG/1
TABLET, FILM COATED ORAL
Qty: 180 TAB | Refills: 1 | Status: SHIPPED | OUTPATIENT
Start: 2019-02-14 | End: 2019-11-07 | Stop reason: SDUPTHER

## 2019-02-14 NOTE — PROGRESS NOTES
Botox Injection Note 2019 Patient:  Iain Murdock YOB: 1975 Date of Visit: 2019 Indication: patient has chronic migraine headaches greater than 15 days per month lasting more than 4 hours each. She has failed or not tolerated 2 or more medication preventatives. Written consent was signed and verified by me. Risks and benefits were discussed to include possible cosmetic asymmetry which is not permament or life threatening. Patient name and  was confirmed prior to procedure. Time out performed. Procedure:  
Botox concentration: 200 units in 2 ml of preservative-free normal saline. 1:1 dilution. LOT#: [id number] EXP:  2021 Injections and distribution as follow :  
 
 Units/site  Sites Loc Subtotal   
procerus 5 1 ML 5  
corrugaters 2.5 2 BL 5  
frontalis 5 8 BL 40 Temporalis 10 4 BL 40 Occipitalis 10 2 BL 20 Cervical paraspinals 5 4 BL 20 Trapezius 10 4 BL 40  
 
   
200 units Botox were reconstituted, 170 units injected as above and the remainder was unavoidably wasted. Patient tolerated procedure well. Return in 3 months for repeat injections. _____________________________ Riki Meza, DO 
NEUROLOGIST Diplomate, ABPN 
 
 
]

## 2019-02-14 NOTE — PROGRESS NOTES
Pt here for botox. States she is having problems with her eyes. Hard time focusing Depression screen completed.

## 2019-02-15 ENCOUNTER — OFFICE VISIT (OUTPATIENT)
Dept: ONCOLOGY | Age: 44
End: 2019-02-15

## 2019-02-15 VITALS
DIASTOLIC BLOOD PRESSURE: 80 MMHG | WEIGHT: 241 LBS | SYSTOLIC BLOOD PRESSURE: 114 MMHG | OXYGEN SATURATION: 96 % | TEMPERATURE: 98.5 F | HEART RATE: 73 BPM | BODY MASS INDEX: 40.15 KG/M2 | HEIGHT: 65 IN | RESPIRATION RATE: 16 BRPM

## 2019-02-15 DIAGNOSIS — I15.9 SECONDARY HYPERTENSION: ICD-10-CM

## 2019-02-15 DIAGNOSIS — D68.59 THROMBOPHILIA (HCC): Primary | ICD-10-CM

## 2019-02-15 DIAGNOSIS — E66.01 OBESITY, MORBID (HCC): ICD-10-CM

## 2019-02-15 NOTE — PROGRESS NOTES
[facility] [place] [facility] [place] [address] [address], [address] W: [phone]  F: [phone] for Visit:  
Nichelle Bond is a 41 y.o. female who is seen in consultation at the request of Dr. Mariluz Moreno for evaluation of recurrent strokes History of Present Illness:  
Patient is a 41 y.o. female who is seen to to rule out hypercoagulability She follows closely with neurology for a long history of migraine headaches and hypertension had ever since age of 18. Evaluation included an MRI brain November 2018 when she reported some loss of peripheral vision. This suggested a possible and thrombosed cortical vein. Additionally included an echocardiogram that was normal and did not show any shunts. She was then tested for protein C, protein S, factor V Leiden, but antibody syndrome and is referred to hematology for further evaluation. She was placed on aspirin. She comes with her daughter. She still has had some peripheral loss of vision. She still has HAs. She has had no focal weakness. She has some DOES, rare Chest pain but this has taken her to the ER before. She has had no recent fevers, chills, CP, Rashes, no weight changes , no lumps or bumps. She has HTN and high cholestrol. She has a h/o LLE DVT 2 years ago after a foot surgery - she was on warfarin for barely a month. She has not had any other clots even after pregnancy . She is not on contraceptives She smokes 1/2 ppd x 20 years No ETOH 
 
FH She thinks her brother has had blood clots Mother had lung cancer Grandfather had lung cancer Past Medical History:  
Diagnosis Date  Diabetes ([address]  Headache  Hypertension Past Surgical History:  
Procedure Laterality Date  HX BUNIONECTOMY Bilateral   
 HX CHOLECYSTECTOMY  HX GI    
 HX KNEE ARTHROSCOPY Left  HX ORTHOPAEDIC    
 HX TUBAL LIGATION Social History Tobacco Use  
  Smoking status: Current Every Day Smoker Packs/day: 0.50  Smokeless tobacco: Never Used Substance Use Topics  Alcohol use: No  
  Frequency: Never Family History Problem Relation Age of Onset  Cancer Mother Current Outpatient Medications Medication Sig  topiramate (TOPAMAX) 50 mg tablet 1 tab twice daily thereafter  aspirin delayed-release 81 mg tablet Take  by mouth daily.  onabotulinumtoxinA (BOTOX) 200 unit injection Inject selected muscles head and neck bilaterally every 12 weeks  furosemide (LASIX) 20 mg tablet Take  by mouth daily.  lisinopril (PRINIVIL, ZESTRIL) 10 mg tablet Take  by mouth daily.  metoprolol tartrate (LOPRESSOR) 25 mg tablet Take  by mouth two (2) times a day.  atorvastatin (LIPITOR) 40 mg tablet Take  by mouth daily.  escitalopram oxalate (LEXAPRO) 20 mg tablet Take 1 Tab by mouth daily.  acetaminophen (TYLENOL EXTRA STRENGTH) 500 mg tablet Take 1,000 mg by mouth every six (6) hours as needed for Pain.  fremanezumab-vfrm 225 mg/1.5 mL syrg 1 Syringe by SubCUTAneous route every month.  Omeprazole delayed release (PRILOSEC D/R) 20 mg tablet Take 20 mg by mouth daily. No current facility-administered medications for this visit. Allergies Allergen Reactions  Latex Swelling RASH and THROAT SWELLING 
  
 Pcn [Penicillins] Swelling Eyes swell shut  Penicillins Unknown (comments)  Sulfa (Sulfonamide Antibiotics) Swelling Eyes swell shut  Sulfa (Sulfonamide Antibiotics) Unknown (comments) Review of Systems: A complete review of systems was obtained, negative except as described above. Physical Exam:  
 
Visit Vitals /80 (BP 1 Location: Left arm, BP Patient Position: Sitting) Pulse 73 Temp 98.5 °F (36.9 °C) (Oral) Resp 16 Ht 5' 5\" (1.651 m) Wt 241 lb (109.3 kg) LMP 01/09/2019 (Approximate) Comment: irregular SpO2 96% BMI 40.10 kg/m² ECOG PS:1 
 General: No distress Eyes: PERRLA, anicteric sclerae HENT: Atraumatic, OP clear Neck: Supple Lymphatic: No cervical, supraclavicular, or inguinal adenopathy Respiratory: CTAB, normal respiratory effort CV: Normal rate, regular rhythm, no murmurs, no peripheral edema GI: Soft, nontender, nondistended, no masses, no hepatomegaly, no splenomegaly MS: Normal gait and station. Digits without clubbing or cyanosis. Skin: No rashes, ecchymoses, or petechiae. Normal temperature, turgor, and texture. Psych: Alert, oriented, appropriate affect, normal judgment/insight Results:  
 
Lab Results Component Value Date/Time WBC 12.3 (H) 10/06/2016 12:14 AM  
 HGB 13.5 10/06/2016 12:14 AM  
 HCT 39.1 10/06/2016 12:14 AM  
 PLATELET 129 17/12/1465 12:14 AM  
 MCV 90.7 10/06/2016 12:14 AM  
 ABS. NEUTROPHILS 6.3 10/06/2016 12:14 AM  
 
Lab Results Component Value Date/Time Sodium 139 10/06/2016 12:14 AM  
 Potassium 4.0 10/06/2016 12:14 AM  
 Chloride 104 10/06/2016 12:14 AM  
 CO2 25 10/06/2016 12:14 AM  
 Glucose 87 10/06/2016 12:14 AM  
 BUN 11 10/06/2016 12:14 AM  
 Creatinine 0.80 10/06/2016 12:14 AM  
 GFR est AA >60 10/06/2016 12:14 AM  
 GFR est non-AA >60 10/06/2016 12:14 AM  
 Calcium 8.9 10/06/2016 12:14 AM  
 
Lab Results Component Value Date/Time Bilirubin, total 0.2 10/06/2016 12:14 AM  
 ALT (SGPT) 20 10/06/2016 12:14 AM  
 AST (SGOT) 13 (L) 10/06/2016 12:14 AM  
 Alk. phosphatase 98 10/06/2016 12:14 AM  
 Protein, total 7.2 10/06/2016 12:14 AM  
 Albumin 3.7 10/06/2016 12:14 AM  
 Globulin 3.5 10/06/2016 12:14 AM  
 
Lab Results Component Value Date/Time Lipase 113 10/06/2016 12:14 AM  
 
Lab Results Component Value Date/Time  Protein S, Total 82 01/02/2019 12:09 PM  
 Protein S, Free 84 01/02/2019 12:09 PM  
 Protein S-Functional 72 01/02/2019 12:09 PM  
 Protein C Antigen 93 12/11/2018 04:29 PM  
 Protein C-Functional 111 12/11/2018 04:29 PM  
 Anticardiolipin Ab, IgG <10 01/02/2019 12:09 PM  
 Anticardiolipin Ab, IgM 10 01/02/2019 12:09 PM  
 Anticardiolipin Ab, IgA <10 01/02/2019 12:09 PM  
 Beta-2 Glycoprotein I, IgG <10 01/02/2019 12:09 PM  
 Beta-2 Glycoprotein I, IgM <10 01/02/2019 12:09 PM  
 Beta-2 Glycoprotein I, IgA <10 01/02/2019 12:09 PM  
 
Factor V Leiden mutation-negative Factor VIII 
171% Records reviewed and summarized above. Pathology report(s) reviewed above. Radiology report(s) reviewed above. Assessment:  
1) Stroke Noted 11/18 on an MRI Suggestion of cortical vein thrombosis? No cardiac source She has several risk factors for stroke such as Obesity, HTN, Hyperlipidemia and smoking which is the most likely etiology- hence antiplatelets are adequate Reviewed Hyper coag work up thus far and was noted for a FVIII level of 171 % Elevated factor VIII levels have been shown to be associated with an increased risk of venous thrombosis-about 4.8 fold. Factor VIII may be elevated as an acute phase reactant as well. Hence we would have to prove persistence of elevation of factor VIII. This by itself in someone who potentially has metabolic syndrome is not definitively causal. More over despite an association with stroke systemic anticoagulation has not shown a proven efficacy in preventing strokes in patients with an elevated FVIII For now she will continue antiplatelets and I will repeat levels in 2 months with some additional tests 2) HTN 
 
3) Obesity 4) Smoking Counseled on cessation 5) h/o DVT? She states she \" might have had clots\" after a foot and hip surgery No documentation and she does not recall being on [facility] I Will try to get records on this If she truly had recurrent DVTs she would need long term AC regardless of 1 Plan:  
 
· Continue aspirin · Records from Dr. Lalo Carrillo · Repeat VIII, APLA, ATIII, Prothrombin gene mutation in 2 months RTC 2 months I appreciate the opportunity to participate in Ms. Shantal Yeh's care.  
 
Signed By: Ernestine Lorenzo MD

## 2019-02-15 NOTE — PROGRESS NOTES
Jose Elias Hamm is a 41 y.o. female here today as a new patient, referred by Dr. Karl Garay, Hypercoagulable state, Recurrent Strokes.

## 2019-04-02 ENCOUNTER — TELEPHONE (OUTPATIENT)
Dept: NEUROLOGY | Age: 44
End: 2019-04-02

## 2019-04-02 NOTE — TELEPHONE ENCOUNTER
Re: Botox     PA submitted via CMM to [employer]. Pending status:  \"[employer] is reviewing your PA request. Typically an electronic response will be received within 72 hours. To check for an update later, open this request from your dashboard. \"      Will update when determination is made.

## 2019-04-03 NOTE — TELEPHONE ENCOUNTER
Re: Botox    Approval received from [employer].  approved. Auth # [id number]. Auth good 4/2/19 - 7/2/19. No PA required per [facility]. Call reference # [id number]. [facility] is [place]. Phone # is [phone]. Forward to nurse.

## 2019-04-09 ENCOUNTER — OFFICE VISIT (OUTPATIENT)
Dept: NEUROLOGY | Age: 44
End: 2019-04-09

## 2019-04-09 VITALS
RESPIRATION RATE: 18 BRPM | SYSTOLIC BLOOD PRESSURE: 102 MMHG | DIASTOLIC BLOOD PRESSURE: 64 MMHG | BODY MASS INDEX: 40.48 KG/M2 | HEART RATE: 70 BPM | WEIGHT: 243 LBS | HEIGHT: 65 IN | OXYGEN SATURATION: 98 %

## 2019-04-09 DIAGNOSIS — G43.719 INTRACTABLE CHRONIC MIGRAINE WITHOUT AURA AND WITHOUT STATUS MIGRAINOSUS: Primary | ICD-10-CM

## 2019-04-09 RX ORDER — ONDANSETRON 4 MG/1
4 TABLET, ORALLY DISINTEGRATING ORAL
Qty: 30 TAB | Refills: 1 | Status: SHIPPED | OUTPATIENT
Start: 2019-04-09 | End: 2019-06-10 | Stop reason: SDUPTHER

## 2019-04-09 RX ORDER — SUMATRIPTAN 50 MG/1
50 TABLET, FILM COATED ORAL
Qty: 9 TAB | Refills: 3 | Status: SHIPPED | OUTPATIENT
Start: 2019-04-09 | End: 2020-05-04

## 2019-04-09 NOTE — PROGRESS NOTES
Pt here for f/u on headaches. States she is doing better. Depression screen assessed. Learning assessment due in July.

## 2019-04-09 NOTE — PROGRESS NOTES
Chief Complaint   Patient presents with    Headache       HPI    46-year-old woman here to follow-up. I see her for chronic migraine headache. She is on Ajovy and topiramate with minimal control so we added Botox which she had injected on February 14. She has had good improvement after her first injections manifesting as reduced intensity. She still has headache almost every day but improved. She is still pending a sleep evaluation as well. When the headaches acutely develop she uses over-the-counter medicines with minimal results. Headaches still remain diffuse and throbbing with nausea. Review of Systems   Eyes: Positive for photophobia. Negative for double vision. Gastrointestinal: Positive for nausea. Neurological: Positive for headaches. Negative for speech change and focal weakness. Psychiatric/Behavioral: The patient has insomnia. All other systems reviewed and are negative.       Past Medical History:   Diagnosis Date    Anxiety disorder     Depression     [address] [address]     Headache     Hypertension      Family History   Problem Relation Age of Onset    Cancer Mother      Social History     Socioeconomic History    Marital status:      Spouse name: Not on file    Number of children: Not on file    Years of education: Not on file    Highest education level: Not on file   Occupational History    Not on file   Social Needs    Financial resource strain: Not on file    Food insecurity:     Worry: Not on file     Inability: Not on file    Transportation needs:     Medical: Not on file     Non-medical: Not on file   Tobacco Use    Smoking status: Current Every Day Smoker     Packs/day: 0.50    Smokeless tobacco: Never Used   Substance and Sexual Activity    Alcohol use: No     Frequency: Never    Drug use: No    Sexual activity: Yes     Partners: Male   Lifestyle    Physical activity:     Days per week: Not on file     Minutes per session: Not on file    Stress: Not on file   Relationships    Social connections:     Talks on phone: Not on file     Gets together: Not on file     Attends Yarsanism service: Not on file     Active member of club or organization: Not on file     Attends meetings of clubs or organizations: Not on file     Relationship status: Not on file    Intimate partner violence:     Fear of current or ex partner: Not on file     Emotionally abused: Not on file     Physically abused: Not on file     Forced sexual activity: Not on file   Other Topics Concern    Not on file   Social History Narrative    ** Merged History Encounter **          Allergies   Allergen Reactions    Latex Swelling     RASH and THROAT SWELLING      Garlic Hives    Pcn [Penicillins] Swelling     Eyes swell shut      Penicillins Unknown (comments)    Sulfa (Sulfonamide Antibiotics) Swelling     Eyes swell shut      Sulfa (Sulfonamide Antibiotics) Unknown (comments)         Current Outpatient Medications   Medication Sig    SUMAtriptan (IMITREX) 50 mg tablet Take 1 Tab by mouth once as needed for Migraine for up to 1 dose.  ondansetron (ZOFRAN ODT) 4 mg disintegrating tablet Take 1 Tab by mouth every eight (8) hours as needed for Nausea (with headache).  topiramate (TOPAMAX) 50 mg tablet 1 tab twice daily thereafter    aspirin delayed-release 81 mg tablet Take  by mouth daily.  onabotulinumtoxinA (BOTOX) 200 unit injection Inject selected muscles head and neck bilaterally every 12 weeks    furosemide (LASIX) 20 mg tablet Take  by mouth daily.  lisinopril (PRINIVIL, ZESTRIL) 10 mg tablet Take  by mouth daily.  metoprolol tartrate (LOPRESSOR) 25 mg tablet Take  by mouth two (2) times a day.  atorvastatin (LIPITOR) 40 mg tablet Take  by mouth daily.  fremanezumab-vfrm 225 mg/1.5 mL syrg 1 Syringe by SubCUTAneous route every month.  escitalopram oxalate (LEXAPRO) 20 mg tablet Take 1 Tab by mouth daily.     Omeprazole delayed release (PRILOSEC D/R) 20 mg tablet Take 20 mg by mouth daily.  acetaminophen (TYLENOL EXTRA STRENGTH) 500 mg tablet Take 1,000 mg by mouth every six (6) hours as needed for Pain. No current facility-administered medications for this visit. Neurologic Exam     Mental Status   WD/WN adult in NAD, normal grooming  VSS  A&O x 3    PERRL, nonicteric  Face is symmetric, tongue midline  Speech is fluent and clear  No limb ataxia. No abnl movements. Moving all extemities spontaneously and symmetric  Normal gait    CVS RRR  Lungs nonlabored  Skin is warm and dry         Visit Vitals  /64   Pulse 70   Resp 18   Ht 5' 5\" (1.651 m)   Wt 110.2 kg (243 lb)   LMP 12/19/2018 (Approximate)   SpO2 98%   BMI 40.44 kg/m²       Assessment and Plan   Diagnoses and all orders for this visit:    1. Intractable chronic migraine without aura and without status migrainosus    Other orders  -     SUMAtriptan (IMITREX) 50 mg tablet; Take 1 Tab by mouth once as needed for Migraine for up to 1 dose. -     ondansetron (ZOFRAN ODT) 4 mg disintegrating tablet; Take 1 Tab by mouth every eight (8) hours as needed for Nausea (with headache). 79-year-old woman with chronic migraines who has more than 15 days of headache pain per month. She received her first series of Botox injections with some improvement. I do not consider this a failure at this point since she is showing reduced intensity. I think she needs another few rounds of Botox to optimize benefit. Continue Ajovy monthly and topiramate. A trial of sumatriptan and Zofran acutely for severe migraines at develop. Side effects discussed. I will see her for Botox and medication management. Complete sleep evaluation. Untreated sleep apnea will make migraines potentially refractory to treatment. I reviewed and decided to continue the current medications.       [address] [facility], [address]  Diplomate ABPN

## 2019-04-09 NOTE — LETTER
4/9/2019 Patient:  Lorena Richard YOB: 1975 Date of Visit: 4/9/2019 Dear Dot Walters NP 
[address] [facility] [address] VIA Facsimile: [phone] 
 : 
 
 
I was requested by MARIA C Hughes to evaluate Ms. Vibha Goode  for Chief Complaint Patient presents with  
 Headache Penelope Philip I am recommending the following:  
 
Diagnoses and all orders for this visit: 
 
1. Intractable chronic migraine without aura and without status migrainosus Other orders -     SUMAtriptan (IMITREX) 50 mg tablet; Take 1 Tab by mouth once as needed for Migraine for up to 1 dose. -     ondansetron (ZOFRAN ODT) 4 mg disintegrating tablet; Take 1 Tab by mouth every eight (8) hours as needed for Nausea (with headache). 
 
 
 
---------------------------------------------------------------------------------------------------------------------- Below is my encounter: Chief Complaint Patient presents with  
 Headache HPI 
82-year-old woman here to follow-up. I see her for chronic migraine headache. She is on Ajovy and topiramate with minimal control so we added Botox which she had injected on February 14. She has had good improvement after her first injections manifesting as reduced intensity. She still has headache almost every day but improved. She is still pending a sleep evaluation as well. When the headaches acutely develop she uses over-the-counter medicines with minimal results. Headaches still remain diffuse and throbbing with nausea. Review of Systems Eyes: Positive for photophobia. Negative for double vision. Gastrointestinal: Positive for nausea. Neurological: Positive for headaches. Negative for speech change and focal weakness. Psychiatric/Behavioral: The patient has insomnia. All other systems reviewed and are negative. Past Medical History:  
Diagnosis Date  Anxiety disorder  Depression  Diabetes ([address]  Headache  Hypertension Family History Problem Relation Age of Onset  Cancer Mother Social History Socioeconomic History  Marital status:  Spouse name: Not on file  Number of children: Not on file  Years of education: Not on file  Highest education level: Not on file Occupational History  Not on file Social Needs  Financial resource strain: Not on file  Food insecurity:  
  Worry: Not on file Inability: Not on file  Transportation needs:  
  Medical: Not on file Non-medical: Not on file Tobacco Use  Smoking status: Current Every Day Smoker Packs/day: 0.50  Smokeless tobacco: Never Used Substance and Sexual Activity  Alcohol use: No  
  Frequency: Never  Drug use: No  
 Sexual activity: Yes  
  Partners: Male Lifestyle  Physical activity:  
  Days per week: Not on file Minutes per session: Not on file  Stress: Not on file Relationships  Social connections:  
  Talks on phone: Not on file Gets together: Not on file Attends Yarsanism service: Not on file Active member of club or organization: Not on file Attends meetings of clubs or organizations: Not on file Relationship status: Not on file  Intimate partner violence:  
  Fear of current or ex partner: Not on file Emotionally abused: Not on file Physically abused: Not on file Forced sexual activity: Not on file Other Topics Concern  Not on file Social History Narrative ** Merged History Encounter ** Allergies Allergen Reactions  Latex Swelling RASH and THROAT SWELLING 
  
 Garlic Hives  Pcn [Penicillins] Swelling Eyes swell shut  Penicillins Unknown (comments)  Sulfa (Sulfonamide Antibiotics) Swelling Eyes swell shut  Sulfa (Sulfonamide Antibiotics) Unknown (comments) Current Outpatient Medications Medication Sig  
  SUMAtriptan (IMITREX) 50 mg tablet Take 1 Tab by mouth once as needed for Migraine for up to 1 dose.  ondansetron (ZOFRAN ODT) 4 mg disintegrating tablet Take 1 Tab by mouth every eight (8) hours as needed for Nausea (with headache).  topiramate (TOPAMAX) 50 mg tablet 1 tab twice daily thereafter  aspirin delayed-release 81 mg tablet Take  by mouth daily.  onabotulinumtoxinA (BOTOX) 200 unit injection Inject selected muscles head and neck bilaterally every 12 weeks  furosemide (LASIX) 20 mg tablet Take  by mouth daily.  lisinopril (PRINIVIL, ZESTRIL) 10 mg tablet Take  by mouth daily.  metoprolol tartrate (LOPRESSOR) 25 mg tablet Take  by mouth two (2) times a day.  atorvastatin (LIPITOR) 40 mg tablet Take  by mouth daily.  fremanezumab-vfrm 225 mg/1.5 mL syrg 1 Syringe by SubCUTAneous route every month.  escitalopram oxalate (LEXAPRO) 20 mg tablet Take 1 Tab by mouth daily.  Omeprazole delayed release (PRILOSEC D/R) 20 mg tablet Take 20 mg by mouth daily.  acetaminophen (TYLENOL EXTRA STRENGTH) 500 mg tablet Take 1,000 mg by mouth every six (6) hours as needed for Pain. No current facility-administered medications for this visit. Neurologic Exam  
 
Mental Status WD/WN adult in NAD, normal grooming VSS 
A&O x 3 PERRL, nonicteric Face is symmetric, tongue midline Speech is fluent and clear No limb ataxia. No abnl movements. Moving all extemities spontaneously and symmetric Normal gait CVS RRR Lungs nonlabored Skin is warm and dry Visit Vitals /64 Pulse 70 Resp 18 Ht 5' 5\" (1.651 m) Wt 110.2 kg (243 lb) LMP 12/19/2018 (Approximate) SpO2 98% BMI 40.44 kg/m² Assessment and Plan Diagnoses and all orders for this visit: 
 
1. Intractable chronic migraine without aura and without status migrainosus Other orders -     SUMAtriptan (IMITREX) 50 mg tablet;  Take 1 Tab by mouth once as needed for Migraine for up to 1 dose. -     ondansetron (ZOFRAN ODT) 4 mg disintegrating tablet; Take 1 Tab by mouth every eight (8) hours as needed for Nausea (with headache). 82-year-old woman with chronic migraines who has more than 15 days of headache pain per month. She received her first series of Botox injections with some improvement. I do not consider this a failure at this point since she is showing reduced intensity. I think she needs another few rounds of Botox to optimize benefit. Continue Ajovy monthly and topiramate. A trial of sumatriptan and Zofran acutely for severe migraines at develop. Side effects discussed. I will see her for Botox and medication management. Complete sleep evaluation. Untreated sleep apnea will make migraines potentially refractory to treatment. Thank you for giving me the opportunity to assist in the care of Ms. Michelle Greene. If you have questions, please do not hesitate to contact me. Sincerely, [address] [facility], DO Neurologist 
Brain Injury Medicine Diplomate RACHEL

## 2019-04-09 NOTE — PROGRESS NOTES
Pt here to f/u on her headaches and meds. Headaches have improved, botox works/ amazing. Still having a lot of swelling.  
 
Depression screen assessed,

## 2019-04-15 DIAGNOSIS — D68.59 THROMBOPHILIA (HCC): ICD-10-CM

## 2019-04-16 ENCOUNTER — TELEPHONE (OUTPATIENT)
Dept: SLEEP MEDICINE | Age: 44
End: 2019-04-16

## 2019-04-16 DIAGNOSIS — G47.33 OSA (OBSTRUCTIVE SLEEP APNEA): Primary | ICD-10-CM

## 2019-04-16 NOTE — TELEPHONE ENCOUNTER
Orders Placed This Encounter    SLEEP STUDY UNATTENDED, 4 CHANNEL     Order Specific Question:   Reason for Exam     Answer:   EL

## 2019-04-16 NOTE — TELEPHONE ENCOUNTER
Patient called stating that she has to cancel her in lab sleep study for wednesday because her insurance will not cover. They will pay for HST but not in lab. Please advise.

## 2019-04-29 ENCOUNTER — TELEPHONE (OUTPATIENT)
Dept: SLEEP MEDICINE | Age: 44
End: 2019-04-29

## 2019-05-01 ENCOUNTER — DOCUMENTATION ONLY (OUTPATIENT)
Dept: NEUROLOGY | Age: 44
End: 2019-05-01

## 2019-05-09 ENCOUNTER — OFFICE VISIT (OUTPATIENT)
Dept: SLEEP MEDICINE | Age: 44
End: 2019-05-09

## 2019-05-09 ENCOUNTER — OFFICE VISIT (OUTPATIENT)
Dept: NEUROLOGY | Age: 44
End: 2019-05-09

## 2019-05-09 VITALS
HEIGHT: 65 IN | OXYGEN SATURATION: 98 % | DIASTOLIC BLOOD PRESSURE: 60 MMHG | RESPIRATION RATE: 16 BRPM | BODY MASS INDEX: 41.62 KG/M2 | WEIGHT: 249.8 LBS | SYSTOLIC BLOOD PRESSURE: 102 MMHG | HEART RATE: 70 BPM

## 2019-05-09 DIAGNOSIS — G47.33 OBSTRUCTIVE SLEEP APNEA (ADULT) (PEDIATRIC): Primary | ICD-10-CM

## 2019-05-09 DIAGNOSIS — G43.719 INTRACTABLE CHRONIC MIGRAINE WITHOUT AURA AND WITHOUT STATUS MIGRAINOSUS: Primary | ICD-10-CM

## 2019-05-09 NOTE — PROGRESS NOTES
Botox Injection Note 2019 Patient:  Pollo Hutton YOB: 1975 Date of Visit: 2019 Indication: patient has chronic migraine headaches greater than 15 days per month lasting more than 4 hours each. She has failed or not tolerated 2 or more medication preventatives. Written consent was signed and verified by me. Risks and benefits were discussed to include possible cosmetic asymmetry which is not permament or life threatening. Patient name and  was confirmed prior to procedure. Time out performed. Procedure:  
Botox concentration: 200 units in 2 ml of preservative-free normal saline. 1:1 dilution. LOT#: [id number] EXP:  10 2021 Injections and distribution as follow :  
 
 Units/site  Sites Loc Subtotal   
procerus 5 1 ML 5  
corrugaters 2.5 2 BL 5  
frontalis 5 8 BL 40 Temporalis 10 4 BL 40 Occipitalis 10 2 BL 20 Cervical paraspinals 5 4 BL 20 Trapezius 10 4 BL 40  
 
   
200 units Botox were reconstituted, 170 units injected as above and the remainder was unavoidably wasted. Patient tolerated procedure well. Return in 3 months for repeat injections. _____________________________ Vevelyn Dec, DO 
NEUROLOGIST Jhoan Song

## 2019-05-09 NOTE — PROGRESS NOTES
[address] [place]. [place], [address]  Tel.  [phone]  Fax. [address] [facility] [address]  [place], [address] [facility]  Tel.  [phone]  Fax. [phone] [facility] [address]   Tel.  [phone]  Fax. [phone]       S>Shantal Hager is a 41 y.o. female seen today to receive a home sleep testing unit (HST). · Patient was educated on proper hookup and operation of the HST. · Instruction forms and documentation were reviewed and signed. · The patient demonstrated good understanding of the HST.    O>    There were no vitals taken for this visit. A>  1. Obstructive sleep apnea (adult) (pediatric)          P>  · General information regarding operations and maintenance of the device was provided. · She was provided information on sleep apnea including coresponding risk factors and the importance of proper treatment. · Follow-up appointment was made to return the HST. She will be contacted once the results have been reviewed. · She was asked to contact our office for any problems regarding her home sleep test study.

## 2019-05-09 NOTE — PROGRESS NOTES
Ms. Anabell Newell presents today for Botox injections for migraine prevention. Depression screening done on this patient.

## 2019-05-13 ENCOUNTER — DOCUMENTATION ONLY (OUTPATIENT)
Dept: SLEEP MEDICINE | Age: 44
End: 2019-05-13

## 2019-05-15 ENCOUNTER — TELEPHONE (OUTPATIENT)
Dept: SLEEP MEDICINE | Age: 44
End: 2019-05-15

## 2019-05-15 DIAGNOSIS — D68.59 THROMBOPHILIA (HCC): ICD-10-CM

## 2019-05-15 LAB
AT III ACT/NOR PPP CHRO: 97 % (ref 75–135)
DILUTE PROTHROMBIN TIME(DPT), 005201: 46.1 SEC (ref 0–55)
DPT CONFIRM RATIO, 005203: 1.26 RATIO (ref 0–1.4)
F2 GENE MUT ANL BLD/T: NORMAL
FACT VIII ACT/NOR PPP: 130 % (ref 57–163)
INTERPRETATION, 117893: NORMAL
SCREEN APTT: 34.2 SEC (ref 0–51.9)
SCREEN DRVVT: 35.6 SEC (ref 0–47)
THROMBIN TIME: 16.8 SEC (ref 0–23)

## 2019-05-16 ENCOUNTER — OFFICE VISIT (OUTPATIENT)
Dept: ONCOLOGY | Age: 44
End: 2019-05-16

## 2019-05-16 VITALS
BODY MASS INDEX: 42.07 KG/M2 | HEIGHT: 65 IN | HEART RATE: 66 BPM | DIASTOLIC BLOOD PRESSURE: 84 MMHG | TEMPERATURE: 98.4 F | SYSTOLIC BLOOD PRESSURE: 127 MMHG | OXYGEN SATURATION: 96 % | WEIGHT: 252.5 LBS | RESPIRATION RATE: 18 BRPM

## 2019-05-16 DIAGNOSIS — K59.1 FUNCTIONAL DIARRHEA: Primary | ICD-10-CM

## 2019-05-16 DIAGNOSIS — E66.01 OBESITY, MORBID (HCC): ICD-10-CM

## 2019-05-16 DIAGNOSIS — D68.59 THROMBOPHILIA (HCC): ICD-10-CM

## 2019-05-16 NOTE — PROGRESS NOTES
[facility] at [address] [place] [address] Tatiana Hooker 084, 8981 Darnell Joshi W: [phone]  F: [phone] Reason for Visit:  
Kwaku Mac is a 41 y.o. female who is seen for recurrent strokes History of Present Illness:  
Patient is a 41 y.o. female who is seen for above. She follows closely with neurology for a long history of migraine headaches and hypertension had ever since age of 18. Evaluation included an MRI brain November 2018 when she reported some loss of peripheral vision. This suggested a possible and thrombosed cortical vein. Additionally included an echocardiogram that was normal and did not show any shunts. She was then tested for protein C, protein S, factor V Leiden, anti phospholipid antibody syndrome that were negative. She was placed on aspirin. She has fatigue, she has some fecal incontinence, has occasional diarrhea. No recent clots or strokes. She states that she has a h/o LLE DVT 2 years ago after a foot surgery - she was on warfarin for barely a month. She has not had any other clots even after pregnancy . She is not on contraceptives She smokes 1/2 ppd x 20 years No ETOH 
 
FH She thinks her brother has had blood clots Mother had lung cancer Grandfather had lung cancer Past Medical History:  
Diagnosis Date  Anxiety disorder  Depression  Diabetes ([address]  Headache  Hypertension Past Surgical History:  
Procedure Laterality Date  HX BUNIONECTOMY Bilateral   
 HX CHOLECYSTECTOMY  HX GI    
 HX KNEE ARTHROSCOPY Left  HX ORTHOPAEDIC    
 HX TUBAL LIGATION Social History Tobacco Use  Smoking status: Current Every Day Smoker Packs/day: 0.50  Smokeless tobacco: Never Used Substance Use Topics  Alcohol use: No  
  Frequency: Never Family History Problem Relation Age of Onset  Cancer Mother Current Outpatient Medications Medication Sig  
  ondansetron (ZOFRAN ODT) 4 mg disintegrating tablet Take 1 Tab by mouth every eight (8) hours as needed for Nausea (with headache).  topiramate (TOPAMAX) 50 mg tablet 1 tab twice daily thereafter  aspirin delayed-release 81 mg tablet Take  by mouth daily.  onabotulinumtoxinA (BOTOX) 200 unit injection Inject selected muscles head and neck bilaterally every 12 weeks  furosemide (LASIX) 20 mg tablet Take  by mouth daily.  lisinopril (PRINIVIL, ZESTRIL) 10 mg tablet Take  by mouth daily.  metoprolol tartrate (LOPRESSOR) 25 mg tablet Take  by mouth two (2) times a day.  atorvastatin (LIPITOR) 40 mg tablet Take  by mouth daily.  fremanezumab-vfrm 225 mg/1.5 mL syrg 1 Syringe by SubCUTAneous route every month.  escitalopram oxalate (LEXAPRO) 20 mg tablet Take 1 Tab by mouth daily.  Omeprazole delayed release (PRILOSEC D/R) 20 mg tablet Take 20 mg by mouth daily.  acetaminophen (TYLENOL EXTRA STRENGTH) 500 mg tablet Take 1,000 mg by mouth every six (6) hours as needed for Pain. No current facility-administered medications for this visit. Allergies Allergen Reactions  Latex Swelling RASH and THROAT SWELLING 
  
 Garlic Hives  Pcn [Penicillins] Swelling Eyes swell shut  Penicillins Unknown (comments)  Sulfa (Sulfonamide Antibiotics) Swelling Eyes swell shut  Sulfa (Sulfonamide Antibiotics) Unknown (comments) Review of Systems: A complete review of systems was obtained, negative except as described above. Physical Exam:  
 
Visit Vitals /84 (BP 1 Location: Left arm, BP Patient Position: Sitting) Pulse 66 Temp 98.4 °F (36.9 °C) (Oral) Resp 18 Ht 5' 5\" (1.651 m) Wt 252 lb 8 oz (114.5 kg) LMP 04/25/2019 (Exact Date) SpO2 96% BMI 42.02 kg/m² ECOG PS:1 General: No distress Eyes: PERRLA, anicteric sclerae HENT: Atraumatic, OP clear Neck: Supple Lymphatic: No cervical, supraclavicular, or inguinal adenopathy MS: Normal gait and station. Digits without clubbing or cyanosis. Skin: No rashes, ecchymoses, or petechiae. Normal temperature, turgor, and texture. Psych: Alert, oriented, appropriate affect, normal judgment/insight Results:  
 
Lab Results Component Value Date/Time WBC 12.3 (H) 10/06/2016 12:14 AM  
 HGB 13.5 10/06/2016 12:14 AM  
 HCT 39.1 10/06/2016 12:14 AM  
 PLATELET 786 98/98/7040 12:14 AM  
 MCV 90.7 10/06/2016 12:14 AM  
 ABS. NEUTROPHILS 6.3 10/06/2016 12:14 AM  
 
Lab Results Component Value Date/Time Sodium 139 10/06/2016 12:14 AM  
 Potassium 4.0 10/06/2016 12:14 AM  
 Chloride 104 10/06/2016 12:14 AM  
 CO2 25 10/06/2016 12:14 AM  
 Glucose 87 10/06/2016 12:14 AM  
 BUN 11 10/06/2016 12:14 AM  
 Creatinine 0.80 10/06/2016 12:14 AM  
 GFR est AA >60 10/06/2016 12:14 AM  
 GFR est non-AA >60 10/06/2016 12:14 AM  
 Calcium 8.9 10/06/2016 12:14 AM  
 
Lab Results Component Value Date/Time Bilirubin, total 0.2 10/06/2016 12:14 AM  
 ALT (SGPT) 20 10/06/2016 12:14 AM  
 AST (SGOT) 13 (L) 10/06/2016 12:14 AM  
 Alk. phosphatase 98 10/06/2016 12:14 AM  
 Protein, total 7.2 10/06/2016 12:14 AM  
 Albumin 3.7 10/06/2016 12:14 AM  
 Globulin 3.5 10/06/2016 12:14 AM  
 
Lab Results Component Value Date/Time Lipase 113 10/06/2016 12:14 AM  
 
Lab Results Component Value Date/Time  Factor VIII Activity 130 05/09/2019 09:27 AM  
 Antithrombin Activity 97 05/09/2019 09:27 AM  
 Protein S, Total 82 01/02/2019 12:09 PM  
 Protein S, Free 84 01/02/2019 12:09 PM  
 Protein S-Functional 72 01/02/2019 12:09 PM  
 Protein C Antigen 93 12/11/2018 04:29 PM  
 Protein C-Functional 111 12/11/2018 04:29 PM  
 Anticardiolipin Ab, IgG <10 01/02/2019 12:09 PM  
 Anticardiolipin Ab, IgM 10 01/02/2019 12:09 PM  
 Anticardiolipin Ab, IgA <10 01/02/2019 12:09 PM  
 Beta-2 Glycoprotein I, IgG <10 01/02/2019 12:09 PM  
 Beta-2 Glycoprotein I, IgM <10 01/02/2019 12:09 PM  
 Beta-2 Glycoprotein I, IgA <10 01/02/2019 12:09 PM  
 
Factor V Leiden mutation-negative Factor VIII 
171% Outside CBC 4/29/19 UNDER care every where shows a normal Hb, WBC slightly elevated at 11.6k Records reviewed and summarized above. Pathology report(s) reviewed above. Radiology report(s) reviewed above. Assessment:  
1) Stroke Noted 11/18 on an MRI Suggestion of cortical vein thrombosis? No cardiac source She has several risk factors for stroke such as Obesity, HTN, Hyperlipidemia and smoking which is the most likely etiology- hence antiplatelets are adequate Reviewed Hyper coag work up thus far and was noted for a FVIII level of 171 % Repeated VIII ;level and is now in the normal range at 130%. Additionally negative factor II mutation, ATIII normal, APLAN negative She does not have hypercoagulability Continue antiplatelets 2) HTN 
 
3) Obesity 4) Smoking Counseled on cessation 5) h/o DVT? She states she \" might have had clots\" after a foot and hip surgery No documentation and she does not recall being on AC 
 
6) Unexplained diarrhea Refer to GI 7) Leucocytosis Neutrophilic and reactive to smoking Plan:  
 
· Continue aspirin · Refer to GI 
· RTC prn I appreciate the opportunity to participate in Ms. Shantal Yeh's care.  
 
Signed By: Nader Magana MD

## 2019-05-16 NOTE — PROGRESS NOTES
Reviewed record in preparation for visit and have obtained necessary documentation. Identified pt with two pt identifiers(name and ). Health Maintenance Due Topic  Pneumococcal 0-64 years (1 of 1 - PPSV23)  DTaP/Tdap/Td series (1 - Tdap)  PAP AKA CERVICAL CYTOLOGY Chief Complaint Patient presents with  Follow-up 3 mth Wt Readings from Last 3 Encounters:  
19 252 lb 8 oz (114.5 kg) 19 249 lb 12.8 oz (113.3 kg) 19 243 lb (110.2 kg) Temp Readings from Last 3 Encounters:  
02/15/19 98.5 °F (36.9 °C) (Oral) 17 97.8 °F (36.6 °C) 10/05/16 97.8 °F (36.6 °C) BP Readings from Last 3 Encounters:  
19 102/60  
19 102/64  
02/15/19 114/80 Pulse Readings from Last 3 Encounters:  
19 70  
19 70  
02/15/19 73 Learning Assessment: 
:  
 
Learning Assessment 2019 PRIMARY LEARNER Patient Patient PRIMARY LANGUAGE ENGLISH ENGLISH  
LEARNER PREFERENCE PRIMARY READING DEMONSTRATION  
ANSWERED BY patient self RELATIONSHIP SELF SELF Depression Screening: 
:  
 
3 most recent PHQ Screens 2019 Little interest or pleasure in doing things Not at all Feeling down, depressed, irritable, or hopeless Not at all Total Score PHQ 2 0 Fall Risk Assessment: 
:  
 
Fall Risk Assessment, last 12 mths 2/15/2019 Able to walk? Yes Fall in past 12 months? No  
 
 
Abuse Screening: 
:  
 
Abuse Screening Questionnaire 2/15/2019 Do you ever feel afraid of your partner? Elma Guard Are you in a relationship with someone who physically or mentally threatens you? Elma Guard Is it safe for you to go home? [facility] Coordination of Care Questionnaire: 
:  
 
1) Have you been to an emergency room, urgent care clinic since your last visit? no  
Hospitalized since your last visit? no          
 
2) Have you seen or consulted any other health care providers outside of [address] [place] [address] since your last visit? yes Dr. Souza Model 5/14/2019 Heart MD   (Include any pap smears or colon screenings in this section.) 3) Do you have an Advance Directive on file? no 
 
4) Are you interested in receiving information on Advance Directives? NO Patient is accompanied by self I have received verbal consent from [employer] to discuss any/all medical information while they are present in the room.

## 2019-05-17 ENCOUNTER — DOCUMENTATION ONLY (OUTPATIENT)
Dept: SLEEP MEDICINE | Age: 44
End: 2019-05-17

## 2019-05-17 NOTE — TELEPHONE ENCOUNTER
Helen Gannon is to be contacted by lead sleep technologist regarding results of Sleep Testing which was indicative of an average AHI of 4.5 per hour with an SpO2 shekhar of 85% and SpO2 of < 88% being 0 minutes. Patient should return for repeat home sleep apnea testing due to presence of significant risk factors for Obstructive Sleep Apnea - STOP- BANG 5.

## 2019-05-28 NOTE — TELEPHONE ENCOUNTER
Reviewed sleep study results with patient. She expressed understanding and is willing to proceed with a repeat HSAT. Please schedule repeat HSAT.     Thanks

## 2019-05-31 ENCOUNTER — OFFICE VISIT (OUTPATIENT)
Dept: SLEEP MEDICINE | Age: 44
End: 2019-05-31

## 2019-05-31 DIAGNOSIS — G47.33 OBSTRUCTIVE SLEEP APNEA (ADULT) (PEDIATRIC): Primary | ICD-10-CM

## 2019-06-02 ENCOUNTER — HOSPITAL ENCOUNTER (OUTPATIENT)
Dept: SLEEP MEDICINE | Age: 44
Discharge: HOME | End: 2019-06-02
Payer: COMMERCIAL

## 2019-06-02 PROCEDURE — 95806 SLEEP STUDY UNATT&RESP EFFT: CPT | Performed by: INTERNAL MEDICINE

## 2019-06-03 ENCOUNTER — DOCUMENTATION ONLY (OUTPATIENT)
Dept: SLEEP MEDICINE | Age: 44
End: 2019-06-03

## 2019-06-03 NOTE — PROGRESS NOTES
Patient returned Home Sleep Apnea Test today. She was concerned there wasn't enough data as the pulse oximetry sensor came off and wasn't noticed until 830am. Technologist, Sher Hope, reviewed and concluded that there is enough data and repeating is not suggested at this time.

## 2019-06-06 RX ORDER — ESCITALOPRAM OXALATE 20 MG/1
TABLET ORAL
Qty: 90 TAB | Refills: 1 | Status: SHIPPED | OUTPATIENT
Start: 2019-06-06 | End: 2019-12-12 | Stop reason: SDUPTHER

## 2019-06-10 ENCOUNTER — OFFICE VISIT (OUTPATIENT)
Dept: NEUROLOGY | Age: 44
End: 2019-06-10

## 2019-06-10 ENCOUNTER — TELEPHONE (OUTPATIENT)
Dept: SLEEP MEDICINE | Age: 44
End: 2019-06-10

## 2019-06-10 VITALS
OXYGEN SATURATION: 98 % | HEIGHT: 65 IN | SYSTOLIC BLOOD PRESSURE: 142 MMHG | BODY MASS INDEX: 41.22 KG/M2 | HEART RATE: 66 BPM | DIASTOLIC BLOOD PRESSURE: 80 MMHG | RESPIRATION RATE: 16 BRPM | WEIGHT: 247.4 LBS

## 2019-06-10 DIAGNOSIS — G43.719 INTRACTABLE CHRONIC MIGRAINE WITHOUT AURA AND WITHOUT STATUS MIGRAINOSUS: Primary | ICD-10-CM

## 2019-06-10 DIAGNOSIS — G47.33 OSA (OBSTRUCTIVE SLEEP APNEA): Primary | ICD-10-CM

## 2019-06-10 RX ORDER — ONDANSETRON 4 MG/1
4 TABLET, ORALLY DISINTEGRATING ORAL
Qty: 30 TAB | Refills: 1 | Status: SHIPPED | OUTPATIENT
Start: 2019-06-10 | End: 2020-08-19

## 2019-06-10 RX ORDER — METHYLPREDNISOLONE 4 MG/1
TABLET ORAL
Qty: 1 DOSE PACK | Refills: 0 | Status: SHIPPED | OUTPATIENT
Start: 2019-06-10 | End: 2019-07-11

## 2019-06-10 NOTE — PROGRESS NOTES
Emaglity injections 2 syringes a total of 240 mg. Patient identifiers confirmed to be for injections. No complications.

## 2019-06-10 NOTE — LETTER
6/10/2019 Patient:  Bobo Mota YOB: 1975 Date of Visit: 6/10/2019 Dear Laina Guerrero NP 
[id number] [facility] [address] VIA Facsimile: [phone] 
 : 
 
 
I was requested by MARIA C Hughes to evaluate Ms. Lorenza Vargas  for Chief Complaint Patient presents with  Neurologic Problem Severe migraine Kaleigh Ku I am recommending the following:  
 
Diagnoses and all orders for this visit: 
 
1. Intractable chronic migraine without aura and without status migrainosus 
-     THER/PROPH/DIAG INJECTION, SUBCUT/IM Other orders 
-     ondansetron (ZOFRAN ODT) 4 mg disintegrating tablet; Take 1 Tab by mouth every eight (8) hours as needed for Nausea (with headache). -     methylPREDNISolone (MEDROL DOSEPACK) 4 mg tablet; Per package directions 
-     galcanezumab-gnlm (EMGALITY PEN) 120 mg/mL injection; 1 mL by SubCUTAneous route every thirty (30) days. 
-     galcanezumab-gnlm (EMGALITY SYRINGE) 120 mg/mL syrg; 240 mg by SubCUTAneous route now for 1 dose. 
 
 
 
---------------------------------------------------------------------------------------------------------------------- Below is my encounter: Chief Complaint Patient presents with  Neurologic Problem Severe migraine HPI 
68-year-old woman with intractable chronic migraine. Last visit we continued Botox injections which gives her good relief however she does have significant breakthrough during weather changes and when her Botox has worn off. Last visit I initiated Ajovy but it was not covered. We are going to discuss switching today. It has been over 30 days since her last injection. She is also in the midst of a severe 3-day my day pain with nausea and cognitive slowing. Review of Systems Constitutional: Positive for malaise/fatigue. Eyes: Positive for photophobia. Negative for double vision. Gastrointestinal: Positive for nausea and vomiting. Neurological: Positive for headaches. All other systems reviewed and are negative. Past Medical History:  
Diagnosis Date  Anxiety disorder  Depression  Diabetes [address] [address]  Headache  Hypertension Family History Problem Relation Age of Onset  Cancer Mother Social History Socioeconomic History  Marital status:  Spouse name: Not on file  Number of children: Not on file  Years of education: Not on file  Highest education level: Not on file Occupational History  Not on file Social Needs  Financial resource strain: Not on file  Food insecurity:  
  Worry: Not on file Inability: Not on file  Transportation needs:  
  Medical: Not on file Non-medical: Not on file Tobacco Use  Smoking status: Current Every Day Smoker Packs/day: 0.50  Smokeless tobacco: Never Used Substance and Sexual Activity  Alcohol use: No  
  Frequency: Never  Drug use: No  
 Sexual activity: Yes  
  Partners: Male Lifestyle  Physical activity:  
  Days per week: Not on file Minutes per session: Not on file  Stress: Not on file Relationships  Social connections:  
  Talks on phone: Not on file Gets together: Not on file Attends Religion service: Not on file Active member of club or organization: Not on file Attends meetings of clubs or organizations: Not on file Relationship status: Not on file  Intimate partner violence:  
  Fear of current or ex partner: Not on file Emotionally abused: Not on file Physically abused: Not on file Forced sexual activity: Not on file Other Topics Concern  Not on file Social History Narrative ** Merged History Encounter ** Allergies Allergen Reactions  Latex Swelling RASH and THROAT SWELLING 
  
 Garlic Hives  Pcn [Penicillins] Swelling Eyes swell shut  Penicillins Unknown (comments)  Sulfa (Sulfonamide Antibiotics) Swelling Eyes swell shut  Sulfa (Sulfonamide Antibiotics) Unknown (comments) Current Outpatient Medications Medication Sig  
 ondansetron (ZOFRAN ODT) 4 mg disintegrating tablet Take 1 Tab by mouth every eight (8) hours as needed for Nausea (with headache).  methylPREDNISolone (MEDROL DOSEPACK) 4 mg tablet Per package directions  galcanezumab-gnlm (EMGALITY PEN) 120 mg/mL injection 1 mL by SubCUTAneous route every thirty (30) days.  galcanezumab-gnlm (EMGALITY SYRINGE) 120 mg/mL syrg 240 mg by SubCUTAneous route now for 1 dose.  escitalopram oxalate (LEXAPRO) 20 mg tablet TAKE 1 TABLET BY MOUTH ONCE DAILY  topiramate (TOPAMAX) 50 mg tablet 1 tab twice daily thereafter  aspirin delayed-release 81 mg tablet Take  by mouth daily.  onabotulinumtoxinA (BOTOX) 200 unit injection Inject selected muscles head and neck bilaterally every 12 weeks  furosemide (LASIX) 20 mg tablet Take  by mouth daily.  lisinopril (PRINIVIL, ZESTRIL) 10 mg tablet Take 20 mg by mouth daily.  metoprolol tartrate (LOPRESSOR) 25 mg tablet Take  by mouth two (2) times a day.  atorvastatin (LIPITOR) 40 mg tablet Take  by mouth daily.  Omeprazole delayed release (PRILOSEC D/R) 20 mg tablet Take 20 mg by mouth daily.  acetaminophen (TYLENOL EXTRA STRENGTH) 500 mg tablet Take 1,000 mg by mouth every six (6) hours as needed for Pain.  fremanezumab-vfrm 225 mg/1.5 mL syrg 1 Syringe by SubCUTAneous route every month. No current facility-administered medications for this visit. Neurologic Exam  
 
Mental Status WD/WN adult in NAD, normal grooming VSS 
A&O x 3, acutely nauseous PERRL, nonicteric Face is symmetric, tongue midline Speech is fluent and clear No limb ataxia. No abnl movements. Moving all extemities spontaneously and symmetric Normal gait CVS RRR Lungs nonlabored Skin is warm and dry Visit Vitals /80 Pulse 66 Resp 16 Ht 5' 5\" (1.651 m) Wt 112.2 kg (247 lb 6.4 oz) SpO2 98% BMI 41.17 kg/m² Assessment and Plan 1. Intractable chronic migraine without aura and without status migrainosus 
-     THER/PROPH/DIAG INJECTION, SUBCUT/IM Other orders 
-     ondansetron (ZOFRAN ODT) 4 mg disintegrating tablet; Take 1 Tab by mouth every eight (8) hours as needed for Nausea (with headache). -     methylPREDNISolone (MEDROL DOSEPACK) 4 mg tablet; Per package directions 
-     galcanezumab-gnlm (EMGALITY PEN) 120 mg/mL injection; 1 mL by SubCUTAneous route every thirty (30) days. 
-     galcanezumab-gnlm (EMGALITY SYRINGE) 120 mg/mL syrg; 240 mg by SubCUTAneous route now for 1 dose. 82-year-old woman who has intractable chronic migraine having severe breakthrough for the past 3 days. Recommend we continue Botox as it does give her good results during the time it is effective but during severe breakthrough in the setting of traumatic weather changes and when Botox is worn off she has severe headache develop. We will change Ajovy to Emaglity. Loading dose given today here in the office. I also gave her a steroid pack for the current 3-day headache with Zofran. Thank you for giving me the opportunity to assist in the care of Ms. Kishore Rowe. If you have questions, please do not hesitate to contact me. Sincerely, [address] [facility], DO Neurologist 
Brain Injury Medicine Diplomate RACHEL

## 2019-06-10 NOTE — TELEPHONE ENCOUNTER
Shazia Escudero is to be contacted by lead sleep technologist regarding results of Sleep Testing which was indicative of an average AHI of 3 per hour with an SpO2 shekhar of 85% and SpO2 of < 88% being 1.5 minutes. Patient should return for repeat testing due to presence of significant risk factors for Obstructive Sleep Apnea - STOP- BANG 5. Encounter Diagnosis   Name Primary?     EL (obstructive sleep apnea) Yes       Orders Placed This Encounter    POLYSOMNOGRAPHY 1 NIGHT     Standing Status:   Future     Standing Expiration Date:   12/10/2019     Order Specific Question:   Reason for Exam     Answer:   EL

## 2019-06-10 NOTE — PROGRESS NOTES
Ms. Steve Callejas presents today to follow up headaches. She has had a severe headache for the past three days.

## 2019-06-10 NOTE — PATIENT INSTRUCTIONS
PRESCRIPTION REFILL POLICY [facility] Statement to Patients April 1, 2014 In an effort to ensure the large volume of patient prescription refills is processed in the most efficient and expeditious manner, we are asking our patients to assist us by calling your Pharmacy for all prescription refills, this will include also your  Mail Order Pharmacy. The pharmacy will contact our office electronically to continue the refill process. Please do not wait until the last minute to call your pharmacy. We need at least 48 hours (2days) to fill prescriptions. We also encourage you to call your pharmacy before going to  your prescription to make sure it is ready. With regard to controlled substance prescription refill requests (narcotic refills) that need to be picked up at our office, we ask your cooperation by providing us with at least 72 hours (3days) notice that you will need a refill. We will not refill narcotic prescription refill requests after 4:00pm on any weekday, Monday through Thursday, or after 2:00pm on Fridays, or on the weekends. We encourage everyone to explore another way of getting your prescription refill request processed using [employer], our patient web portal through our electronic medical record system. [employer] is an efficient and effective way to communicate your medication request directly to the office and  downloadable as an erika on your smart phone . [employer] also features a review functionality that allows you to view your medication list as well as leave messages for your physician. Are you ready to get connected? If so please review the attatched instructions or speak to any of our staff to get you set up right away! Thank you so much for your cooperation. Should you have any questions please contact our Practice Administrator. The Physicians and Staff,  [facility]

## 2019-06-10 NOTE — PROGRESS NOTES
Chief Complaint   Patient presents with    Neurologic Problem     Severe migraine       HPI    60-year-old woman with intractable chronic migraine. Last visit we continued Botox injections which gives her good relief however she does have significant breakthrough during weather changes and when her Botox has worn off. Last visit I initiated Ajovy but it was not covered. We are going to discuss switching today. It has been over 30 days since her last injection. She is also in the midst of a severe 3-day my day pain with nausea and cognitive slowing. Review of Systems   Constitutional: Positive for malaise/fatigue. Eyes: Positive for photophobia. Negative for double vision. Gastrointestinal: Positive for nausea and vomiting. Neurological: Positive for headaches. All other systems reviewed and are negative.       Past Medical History:   Diagnosis Date    Anxiety disorder     Depression     Diabetes ([facility] [address]     Headache     Hypertension      Family History   Problem Relation Age of Onset    Cancer Mother      Social History     Socioeconomic History    Marital status:      Spouse name: Not on file    Number of children: Not on file    Years of education: Not on file    Highest education level: Not on file   Occupational History    Not on file   Social Needs    Financial resource strain: Not on file    Food insecurity:     Worry: Not on file     Inability: Not on file    Transportation needs:     Medical: Not on file     Non-medical: Not on file   Tobacco Use    Smoking status: Current Every Day Smoker     Packs/day: 0.50    Smokeless tobacco: Never Used   Substance and Sexual Activity    Alcohol use: No     Frequency: Never    Drug use: No    Sexual activity: Yes     Partners: Male   Lifestyle    Physical activity:     Days per week: Not on file     Minutes per session: Not on file    Stress: Not on file   Relationships    Social connections:     Talks on phone: Not on file     Gets together: Not on file     Attends Anabaptism service: Not on file     Active member of club or organization: Not on file     Attends meetings of clubs or organizations: Not on file     Relationship status: Not on file    Intimate partner violence:     Fear of current or ex partner: Not on file     Emotionally abused: Not on file     Physically abused: Not on file     Forced sexual activity: Not on file   Other Topics Concern    Not on file   Social History Narrative    ** Merged History Encounter **          Allergies   Allergen Reactions    Latex Swelling     RASH and THROAT SWELLING      Garlic Hives    Pcn [Penicillins] Swelling     Eyes swell shut      Penicillins Unknown (comments)    Sulfa (Sulfonamide Antibiotics) Swelling     Eyes swell shut      Sulfa (Sulfonamide Antibiotics) Unknown (comments)         Current Outpatient Medications   Medication Sig    ondansetron (ZOFRAN ODT) 4 mg disintegrating tablet Take 1 Tab by mouth every eight (8) hours as needed for Nausea (with headache).  methylPREDNISolone (MEDROL DOSEPACK) 4 mg tablet Per package directions    galcanezumab-gnlm (EMGALITY PEN) 120 mg/mL injection 1 mL by SubCUTAneous route every thirty (30) days.  galcanezumab-gnlm (EMGALITY SYRINGE) 120 mg/mL syrg 240 mg by SubCUTAneous route now for 1 dose.  escitalopram oxalate (LEXAPRO) 20 mg tablet TAKE 1 TABLET BY MOUTH ONCE DAILY    topiramate (TOPAMAX) 50 mg tablet 1 tab twice daily thereafter    aspirin delayed-release 81 mg tablet Take  by mouth daily.  onabotulinumtoxinA (BOTOX) 200 unit injection Inject selected muscles head and neck bilaterally every 12 weeks    furosemide (LASIX) 20 mg tablet Take  by mouth daily.  lisinopril (PRINIVIL, ZESTRIL) 10 mg tablet Take 20 mg by mouth daily.  metoprolol tartrate (LOPRESSOR) 25 mg tablet Take  by mouth two (2) times a day.  atorvastatin (LIPITOR) 40 mg tablet Take  by mouth daily.     Omeprazole delayed release (PRILOSEC D/R) 20 mg tablet Take 20 mg by mouth daily.  acetaminophen (TYLENOL EXTRA STRENGTH) 500 mg tablet Take 1,000 mg by mouth every six (6) hours as needed for Pain.  fremanezumab-vfrm 225 mg/1.5 mL syrg 1 Syringe by SubCUTAneous route every month. No current facility-administered medications for this visit. Neurologic Exam     Mental Status   WD/WN adult in NAD, normal grooming  VSS  A&O x 3, acutely nauseous    PERRL, nonicteric  Face is symmetric, tongue midline  Speech is fluent and clear  No limb ataxia. No abnl movements. Moving all extemities spontaneously and symmetric  Normal gait    CVS RRR  Lungs nonlabored  Skin is warm and dry         Visit Vitals  /80   Pulse 66   Resp 16   Ht 5' 5\" (1.651 m)   Wt 112.2 kg (247 lb 6.4 oz)   SpO2 98%   BMI 41.17 kg/m²       Assessment and Plan   Diagnoses and all orders for this visit:    1. Intractable chronic migraine without aura and without status migrainosus  -     THER/PROPH/DIAG INJECTION, SUBCUT/IM    Other orders  -     ondansetron (ZOFRAN ODT) 4 mg disintegrating tablet; Take 1 Tab by mouth every eight (8) hours as needed for Nausea (with headache). -     methylPREDNISolone (MEDROL DOSEPACK) 4 mg tablet; Per package directions  -     galcanezumab-gnlm (EMGALITY PEN) 120 mg/mL injection; 1 mL by SubCUTAneous route every thirty (30) days.  -     galcanezumab-gnlm (EMGALITY SYRINGE) 120 mg/mL syrg; 240 mg by SubCUTAneous route now for 1 dose. 31-year-old woman who has intractable chronic migraine having severe breakthrough for the past 3 days. Recommend we continue Botox as it does give her good results during the time it is effective but during severe breakthrough in the setting of traumatic weather changes and when Botox is worn off she has severe headache develop. We will change Ajovy to Emaglity. Loading dose given today here in the office.   I also gave her a steroid pack for the current 3-day headache with Zofran.         [address] [facility] [address]  Diplomate ABPN

## 2019-06-10 NOTE — LETTER
NOTIFICATION RETURN TO WORK / SCHOOL 
 
6/10/2019 Ms. Mary Jane Alejo [address] 
[address] [address] To Whom It May Concern: 
 
Mary Jane Alejo is currently under the care of [address] [place] [address]. It is our recommendation that she be off work today due to medication changes. She will return to work/school on: 6/11/2019 If there are questions or concerns please have the patient contact our office. Sincerely, [address], DO

## 2019-06-20 ENCOUNTER — TELEPHONE (OUTPATIENT)
Dept: SLEEP MEDICINE | Age: 44
End: 2019-06-20

## 2019-06-20 NOTE — TELEPHONE ENCOUNTER
Reviewed sleep study results with patient. She expressed understanding and is willing to proceed with a PSG. Please schedule PSG.     Thanks

## 2019-07-05 ENCOUNTER — TELEPHONE (OUTPATIENT)
Dept: NEUROLOGY | Age: 44
End: 2019-07-05

## 2019-07-05 NOTE — TELEPHONE ENCOUNTER
Patient calling wishing to speak to nurse because she has had awful migraines; feels   nauseous and is really unsteady. I tried to call nurse but no one was available. Please advise.     Best # [phone]

## 2019-07-05 NOTE — TELEPHONE ENCOUNTER
Message sent while nurse was rooming patients this morning. I attempted to reach pt, but got a very generic VM. LM to call clinic back.

## 2019-07-08 NOTE — TELEPHONE ENCOUNTER
* Message from [facility] below:        ----- Message from Zully Vilchis sent at 7/5/2019  4:33 PM EDT -----  Regarding: Dr. Janee Hamilton  Pt returning call back from Alisha Leon in reference to migraines.        Best contact [phone]

## 2019-07-11 RX ORDER — METHYLPREDNISOLONE 4 MG/1
TABLET ORAL
Qty: 1 DOSE PACK | Refills: 0 | Status: SHIPPED | OUTPATIENT
Start: 2019-07-11 | End: 2019-08-08 | Stop reason: ALTCHOICE

## 2019-07-11 NOTE — TELEPHONE ENCOUNTER
Can try a pack, if no improvement, consider urgent care for IV meds.   If this is an unusual different headache, pleas go to ER

## 2019-07-19 ENCOUNTER — TELEPHONE (OUTPATIENT)
Dept: NEUROLOGY | Age: 44
End: 2019-07-19

## 2019-07-19 DIAGNOSIS — G43.719 INTRACTABLE CHRONIC MIGRAINE WITHOUT AURA AND WITHOUT STATUS MIGRAINOSUS: Primary | ICD-10-CM

## 2019-07-19 RX ORDER — BUTALBITAL, ACETAMINOPHEN, CAFFEINE AND CODEINE PHOSPHATE 50; 325; 40; 30 MG/1; MG/1; MG/1; MG/1
1 CAPSULE ORAL
Qty: 15 CAP | Refills: 0 | Status: SHIPPED | OUTPATIENT
Start: 2019-07-19 | End: 2019-07-22

## 2019-07-19 NOTE — TELEPHONE ENCOUNTER
If she has already tried Medrol Dosepak that was written on 7/11, she may try Fioricet with codeine.  I will print a prescription.

## 2019-07-19 NOTE — TELEPHONE ENCOUNTER
She is unable to get out of her bed because her headache is so bad. She was taken to the hospital from work yesterday because she was having pains in her head and was very confused. She has had her headache for a couple of weeks.  Please call back

## 2019-08-06 ENCOUNTER — DOCUMENTATION ONLY (OUTPATIENT)
Dept: NEUROLOGY | Age: 44
End: 2019-08-06

## 2019-08-08 ENCOUNTER — OFFICE VISIT (OUTPATIENT)
Dept: NEUROLOGY | Age: 44
End: 2019-08-08

## 2019-08-08 VITALS
BODY MASS INDEX: 40.82 KG/M2 | OXYGEN SATURATION: 98 % | HEART RATE: 73 BPM | HEIGHT: 65 IN | WEIGHT: 245 LBS | SYSTOLIC BLOOD PRESSURE: 110 MMHG | RESPIRATION RATE: 16 BRPM | DIASTOLIC BLOOD PRESSURE: 60 MMHG

## 2019-08-08 DIAGNOSIS — T88.7XXA MEDICATION SIDE EFFECT: ICD-10-CM

## 2019-08-08 DIAGNOSIS — G43.719 INTRACTABLE CHRONIC MIGRAINE WITHOUT AURA AND WITHOUT STATUS MIGRAINOSUS: Primary | ICD-10-CM

## 2019-08-08 RX ORDER — BUTALBITAL, ACETAMINOPHEN, CAFFEINE AND CODEINE PHOSPHATE 50; 325; 40; 30 MG/1; MG/1; MG/1; MG/1
CAPSULE ORAL
COMMUNITY
End: 2021-11-08

## 2019-08-08 NOTE — PROGRESS NOTES
Botox Injection Note     2019     Patient:  Syl Marshall   YOB: 1975  Date of Visit: 2019      Indication: patient has chronic migraine headaches greater than 15 days per month lasting more than 4 hours each. She has failed or not tolerated 2 or more medication preventatives. Written consent was signed and verified by me. Risks and benefits were discussed to include possible cosmetic asymmetry which is not permament or life threatening. Patient name and  was confirmed prior to procedure. Time out performed. Procedure:   Botox concentration: 200 units in 2 ml of preservative-free normal saline. 1:1 dilution. LOT#: [id number]  EXP:  2022    Injections and distribution as follow :      Units/site  Sites Loc Subtotal    procerus 5 1 ML 5   corrugaters 2.5 2 BL 5   frontalis 5 8 BL 40   Temporalis 10 4 BL 40   Occipitalis 10 2 BL 20   Cervical paraspinals 5 4 BL 20   Trapezius 10 4 BL 40         200 units Botox were reconstituted, 170 units injected as above and the remainder was unavoidably wasted. Patient tolerated procedure well. Return in 3 months for repeat injections.     _____________________________   Tania Mi, DO  [address], ABPN

## 2019-08-08 NOTE — PROGRESS NOTES
Pt here for her botox. Since last dose she was taken to ER due to passing out. She becomes \"Out of it\" when this happens, with \"electric shock going through her head. \"   Gets a couple of headaches a week right after the shot, but when it starts wearing off they become daily.

## 2019-08-08 NOTE — PROGRESS NOTES
Patient here for Botox. While Botox is effective she has good results with less than 7 severe debilitating migraines. Fortunately when Botox wears off she has severe headache sometimes confusional type. She did receive Emaglity in the office with. Able to  location. I am going to have her resume Emaglity with a new loading dose. eventually would like to taper and discontinue Topamax due to side effects. She is having some cognitive slowing and memory loss due to the medication. Hold for now until the headaches are better controlled with combination Botox and Emaglity.

## 2019-08-21 RX ORDER — BUTALBITAL, ACETAMINOPHEN, CAFFEINE AND CODEINE PHOSPHATE 50; 325; 40; 30 MG/1; MG/1; MG/1; MG/1
CAPSULE ORAL
OUTPATIENT
Start: 2019-08-21

## 2019-10-21 ENCOUNTER — HOSPITAL ENCOUNTER (OUTPATIENT)
Dept: SLEEP MEDICINE | Age: 44
Discharge: HOME | End: 2019-10-21
Payer: COMMERCIAL

## 2019-10-21 VITALS
BODY MASS INDEX: 40.32 KG/M2 | TEMPERATURE: 98.6 F | HEART RATE: 81 BPM | DIASTOLIC BLOOD PRESSURE: 68 MMHG | WEIGHT: 242 LBS | OXYGEN SATURATION: 97 % | SYSTOLIC BLOOD PRESSURE: 108 MMHG | HEIGHT: 65 IN

## 2019-10-21 DIAGNOSIS — G47.33 OSA (OBSTRUCTIVE SLEEP APNEA): ICD-10-CM

## 2019-10-21 PROCEDURE — 95810 POLYSOM 6/> YRS 4/> PARAM: CPT | Performed by: INTERNAL MEDICINE

## 2019-10-22 ENCOUNTER — TELEPHONE (OUTPATIENT)
Dept: NEUROLOGY | Age: 44
End: 2019-10-22

## 2019-10-22 NOTE — TELEPHONE ENCOUNTER
Re: Botox Approval     Botox [id number] Auth #: [id number] 10/22/19-1/22/20. Botox [id number] no PA required. [facility] ph [phone] or try ph [phone]    Forward to nurse.

## 2019-10-22 NOTE — TELEPHONE ENCOUNTER
Re: Botox    Pa request submitted to [employer] via [facility]    Key: [id number]   PA Case ID: [id number]     Status pending.

## 2019-10-31 ENCOUNTER — TELEPHONE (OUTPATIENT)
Dept: SLEEP MEDICINE | Age: 44
End: 2019-10-31

## 2019-10-31 ENCOUNTER — DOCUMENTATION ONLY (OUTPATIENT)
Dept: NEUROLOGY | Age: 44
End: 2019-10-31

## 2019-10-31 NOTE — TELEPHONE ENCOUNTER
Samara Senior is to be contacted by lead sleep technologist regarding results of Sleep Testing which was indicative of an average AHI of 2.4 per hour with an SpO2 shekhar of 85% and SpO2 of < 88% being 0 minutes. Repeat testing is to be considered if symptoms persist or worsen over time.

## 2019-10-31 NOTE — PROGRESS NOTES
pts botox arrived in office today from [address] [facility]   Exp  4/2022      Pt appt is 11/7/19

## 2019-11-07 ENCOUNTER — OFFICE VISIT (OUTPATIENT)
Dept: NEUROLOGY | Age: 44
End: 2019-11-07

## 2019-11-07 VITALS
RESPIRATION RATE: 14 BRPM | WEIGHT: 245 LBS | HEIGHT: 65 IN | HEART RATE: 73 BPM | OXYGEN SATURATION: 97 % | BODY MASS INDEX: 40.82 KG/M2 | SYSTOLIC BLOOD PRESSURE: 118 MMHG | DIASTOLIC BLOOD PRESSURE: 74 MMHG

## 2019-11-07 DIAGNOSIS — G43.719 INTRACTABLE CHRONIC MIGRAINE WITHOUT AURA AND WITHOUT STATUS MIGRAINOSUS: Primary | ICD-10-CM

## 2019-11-07 RX ORDER — PREDNISONE 20 MG/1
TABLET ORAL 2 TIMES DAILY
COMMUNITY
End: 2021-11-08

## 2019-11-07 RX ORDER — ALBUTEROL SULFATE 90 UG/1
AEROSOL, METERED RESPIRATORY (INHALATION)
COMMUNITY
End: 2021-11-08

## 2019-11-07 RX ORDER — ONDANSETRON 4 MG/1
4 TABLET, ORALLY DISINTEGRATING ORAL
Qty: 30 TAB | Refills: 1 | Status: SHIPPED | OUTPATIENT
Start: 2019-11-07 | End: 2020-02-07 | Stop reason: SDUPTHER

## 2019-11-07 RX ORDER — TOPIRAMATE 50 MG/1
TABLET, FILM COATED ORAL
Qty: 180 TAB | Refills: 1 | Status: SHIPPED | OUTPATIENT
Start: 2019-11-07 | End: 2020-12-04 | Stop reason: SDUPTHER

## 2019-11-07 NOTE — PROGRESS NOTES
Botox Injection Note     2019     Patient:  Alesha Darby   YOB: 1975  Date of Visit: 2019      Indication: patient has chronic migraine headaches greater than 15 days per month lasting more than 4 hours each. She has failed or not tolerated 2 or more medication preventatives. Written consent was signed and verified by me. Risks and benefits were discussed to include possible cosmetic asymmetry which is not permament or life threatening. Patient name and  was confirmed prior to procedure. Time out performed. Procedure:   Botox concentration: 200 units in 2 ml of preservative-free normal saline. 1:1 dilution. LOT#: [id number]  EXP:  2022    Injections and distribution as follow :      Units/site  Sites Loc Subtotal    procerus 5 1 ML 5   corrugaters 2.5 2 BL 5   frontalis 5 8 BL 40   Temporalis 10 4 BL 40   Occipitalis 10 2 BL 20   Cervical paraspinals 5 4 BL 20   Trapezius 10 4 BL 40         200 units Botox were reconstituted, 170 units injected as above and the remainder was unavoidably wasted. Patient tolerated procedure well. Return in 3 months for repeat injections.     _____________________________   Marvin Toth, DO  [address], ABPN

## 2019-11-08 ENCOUNTER — TELEPHONE (OUTPATIENT)
Dept: NEUROLOGY | Age: 44
End: 2019-11-08

## 2019-11-08 NOTE — TELEPHONE ENCOUNTER
Re: Emgality Approved    Approval rec'd from [facility] via [facility]    Reference Number: [id number]   EMGALITY INJ 120MG/ML is approved through 05/08/2020    Fax sent to [phone] [facility]

## 2019-12-20 ENCOUNTER — TELEPHONE (OUTPATIENT)
Dept: NEUROLOGY | Age: 44
End: 2019-12-20

## 2019-12-20 NOTE — TELEPHONE ENCOUNTER
Re: Botox    Attemped prior auth through [facility], rec'd letter stating that patient already has a previously approval.     I called the plan to see if we could initiate a new PA for after 1/22 and was told per [facility] that I will need to wait until after 12/23/19 to re-submit. Will re-attempt next week and update once determination has been made.

## 2019-12-23 NOTE — TELEPHONE ENCOUNTER
Re: Botox approved     [id number]  12/23/19-3/23/20    Botox 84686 no PA required.     [facility] ph [phone]   or   try ph [phone]    Forward to nurse

## 2020-01-22 ENCOUNTER — DOCUMENTATION ONLY (OUTPATIENT)
Dept: NEUROLOGY | Age: 45
End: 2020-01-22

## 2020-01-30 ENCOUNTER — TELEPHONE (OUTPATIENT)
Dept: NEUROLOGY | Age: 45
End: 2020-01-30

## 2020-02-07 ENCOUNTER — OFFICE VISIT (OUTPATIENT)
Dept: NEUROLOGY | Age: 45
End: 2020-02-07

## 2020-02-07 VITALS
DIASTOLIC BLOOD PRESSURE: 82 MMHG | WEIGHT: 260 LBS | HEART RATE: 91 BPM | RESPIRATION RATE: 20 BRPM | BODY MASS INDEX: 43.32 KG/M2 | HEIGHT: 65 IN | OXYGEN SATURATION: 98 % | SYSTOLIC BLOOD PRESSURE: 140 MMHG

## 2020-02-07 DIAGNOSIS — G43.719 INTRACTABLE CHRONIC MIGRAINE WITHOUT AURA AND WITHOUT STATUS MIGRAINOSUS: Primary | ICD-10-CM

## 2020-02-07 NOTE — PROGRESS NOTES
Botox Injection Note     2020     Patient:  Magalys Pan   YOB: 1975  Date of Visit: 2020      Indication: patient has chronic migraine headaches greater than 15 days per month lasting more than 4 hours each. She has failed or not tolerated 2 or more medication preventatives. Written consent was signed and verified by me. Risks and benefits were discussed to include possible cosmetic asymmetry which is not permament or life threatening. Patient name and  was confirmed prior to procedure. Time out performed. Procedure:   Botox concentration: 200 units in 2 ml of preservative-free normal saline. 1:1 dilution. LOT#: [id number]  EXP:  2022    Injections and distribution as follow :      Units/site  Sites Loc Subtotal    procerus 5 1 ML 5   corrugaters 2.5 2 BL 5   frontalis 5 8 BL 40   Temporalis 10 4 BL 40   Occipitalis 10 2 BL 20   Cervical paraspinals 5 4 BL 20   Trapezius 10 4 BL 40         200 units Botox were reconstituted, 170 units injected as above and the remainder was unavoidably wasted. Patient tolerated procedure well. Return in 3 months for repeat injections.     _____________________________   Caryle Kern, DO  [address], ABPN

## 2020-02-07 NOTE — PROGRESS NOTES
Patient here for Botox. She gets good control with Botox on board less than 7 severe breakthrough migraines. Incidentally she tells me she is having some left eye blurry vision for couple days. No vision loss. No double vision. On my exam her left eye has normal motility and normal pupils. Somewhat tearful from that single eye. If that does not improve please see optometry if she develops anything strokelike please come to the hospital.  Unrelated to Botox.

## 2020-03-10 ENCOUNTER — TELEPHONE (OUTPATIENT)
Dept: NEUROLOGY | Age: 45
End: 2020-03-10

## 2020-03-10 NOTE — TELEPHONE ENCOUNTER
Re: Botox approved     Approval for  rec'd from [facility] via [facility]. Botox [id number] Auth #: [id number]  Botox [id number] no PA required.     Jenna Mcnulty-- [phone]

## 2020-04-10 ENCOUNTER — TELEPHONE (OUTPATIENT)
Dept: NEUROLOGY | Age: 45
End: 2020-04-10

## 2020-04-27 ENCOUNTER — TELEPHONE (OUTPATIENT)
Dept: NEUROLOGY | Age: 45
End: 2020-04-27

## 2020-04-27 NOTE — TELEPHONE ENCOUNTER
Re: Emgality approved    [facility]    Approved 04/27/20-04/27/21  [id number]

## 2020-05-04 RX ORDER — SUMATRIPTAN 50 MG/1
TABLET, FILM COATED ORAL
Qty: 9 TAB | Refills: 0 | Status: SHIPPED | OUTPATIENT
Start: 2020-05-04 | End: 2020-10-09

## 2020-05-18 ENCOUNTER — TELEPHONE (OUTPATIENT)
Dept: NEUROLOGY | Age: 45
End: 2020-05-18

## 2020-05-18 NOTE — TELEPHONE ENCOUNTER
I advised patient to call back at the end of this month to schedule appointment-once June Botox day has been established. Schedule is still varying due to COVID 19.

## 2020-06-02 ENCOUNTER — TELEPHONE (OUTPATIENT)
Dept: NEUROLOGY | Age: 45
End: 2020-06-02

## 2020-06-02 NOTE — TELEPHONE ENCOUNTER
Re: Botox approved    Botox  Auth #: [id number]  Botox [id number] no PA required.     [facility]--ph [phone]

## 2020-08-19 RX ORDER — ONDANSETRON 4 MG/1
TABLET, ORALLY DISINTEGRATING ORAL
Qty: 30 TAB | Refills: 0 | Status: SHIPPED | OUTPATIENT
Start: 2020-08-19 | End: 2020-11-24 | Stop reason: SDUPTHER

## 2020-10-09 RX ORDER — SUMATRIPTAN 50 MG/1
TABLET, FILM COATED ORAL
Qty: 9 TAB | Refills: 0 | Status: SHIPPED | OUTPATIENT
Start: 2020-10-09 | End: 2021-01-14

## 2020-11-27 RX ORDER — ONDANSETRON 4 MG/1
4 TABLET, ORALLY DISINTEGRATING ORAL
Qty: 30 TAB | Refills: 0 | Status: SHIPPED | OUTPATIENT
Start: 2020-11-27 | End: 2022-07-23 | Stop reason: SDUPTHER

## 2020-12-07 RX ORDER — ESCITALOPRAM OXALATE 20 MG/1
20 TABLET ORAL DAILY
Qty: 90 TAB | Refills: 0 | Status: SHIPPED | OUTPATIENT
Start: 2020-12-07 | End: 2021-06-23 | Stop reason: SDUPTHER

## 2021-01-04 ENCOUNTER — TELEPHONE (OUTPATIENT)
Dept: NEUROLOGY | Age: 46
End: 2021-01-04

## 2021-01-04 NOTE — TELEPHONE ENCOUNTER
Patient has been in bed for three days with a migraine. If she moves she gets sick. She is unable to work because of it. She wants to know what to do for it.

## 2021-01-04 NOTE — TELEPHONE ENCOUNTER
Called patient, verified, advised per Dr. Carlos Greene recommendations. Patient stated she Norbert Cardonas been getting dizzy and light headed every time I Stand up and this has not happened before, so I will go to the ER and have my daughter drive me. \"

## 2021-01-04 NOTE — TELEPHONE ENCOUNTER
If this is like her usual migraine, I would recommend she call dispatch [facility] and have them come out to her home to give her IV medications. It is essentially urgent care. If there is anything distinctly different about this migraine like fevers chills she needs to come to the emergency room.

## 2021-01-11 RX ORDER — GALCANEZUMAB 120 MG/ML
INJECTION, SOLUTION SUBCUTANEOUS
Qty: 1 ML | Refills: 0 | Status: SHIPPED | OUTPATIENT
Start: 2021-01-11 | End: 2021-02-23 | Stop reason: SDUPTHER

## 2021-01-14 ENCOUNTER — OFFICE VISIT (OUTPATIENT)
Dept: NEUROLOGY | Age: 46
End: 2021-01-14
Payer: COMMERCIAL

## 2021-01-14 VITALS
HEART RATE: 84 BPM | HEIGHT: 65 IN | OXYGEN SATURATION: 98 % | SYSTOLIC BLOOD PRESSURE: 138 MMHG | DIASTOLIC BLOOD PRESSURE: 84 MMHG | WEIGHT: 262 LBS | BODY MASS INDEX: 43.65 KG/M2

## 2021-01-14 DIAGNOSIS — G43.719 INTRACTABLE CHRONIC MIGRAINE WITHOUT AURA AND WITHOUT STATUS MIGRAINOSUS: Primary | ICD-10-CM

## 2021-01-14 PROCEDURE — 99214 OFFICE O/P EST MOD 30 MIN: CPT | Performed by: PSYCHIATRY & NEUROLOGY

## 2021-01-14 RX ORDER — PROMETHAZINE HYDROCHLORIDE 25 MG/1
25 TABLET ORAL
Qty: 30 TAB | Refills: 1 | Status: SHIPPED | OUTPATIENT
Start: 2021-01-14 | End: 2021-11-08

## 2021-01-14 RX ORDER — ZOLPIDEM TARTRATE 5 MG/1
TABLET ORAL
COMMUNITY
Start: 2020-12-16 | End: 2021-11-08

## 2021-01-14 RX ORDER — METHYLPREDNISOLONE 4 MG/1
TABLET ORAL
Qty: 1 DOSE PACK | Refills: 0 | Status: SHIPPED | OUTPATIENT
Start: 2021-01-14 | End: 2021-11-08 | Stop reason: ALTCHOICE

## 2021-01-14 RX ORDER — SUMATRIPTAN 100 MG/1
TABLET, FILM COATED ORAL
Qty: 9 TAB | Refills: 5 | Status: SHIPPED | OUTPATIENT
Start: 2021-01-14 | End: 2022-07-23 | Stop reason: SDUPTHER

## 2021-01-14 RX ORDER — BUSPIRONE HYDROCHLORIDE 7.5 MG/1
TABLET ORAL
COMMUNITY
Start: 2021-01-12 | End: 2021-11-08

## 2021-01-14 RX ORDER — RIMEGEPANT SULFATE 75 MG/75MG
75 TABLET, ORALLY DISINTEGRATING ORAL
Qty: 8 TAB | Refills: 5 | Status: SHIPPED | OUTPATIENT
Start: 2021-01-14 | End: 2021-06-23 | Stop reason: SDUPTHER

## 2021-01-14 RX ORDER — BUTALBITAL, ACETAMINOPHEN AND CAFFEINE 50; 325; 40 MG/1; MG/1; MG/1
TABLET ORAL
COMMUNITY
End: 2021-11-08

## 2021-01-14 RX ORDER — PROMETHAZINE HYDROCHLORIDE 25 MG/1
TABLET ORAL
COMMUNITY
End: 2021-01-14 | Stop reason: SDUPTHER

## 2021-01-14 NOTE — PROGRESS NOTES
Chief Complaint   Patient presents with    Migraine     follow up \"not doing great, when I move my head spins. \"       HPI    Shantal is a 53-year-old woman following up. She has chronic migraine headaches. She is on a daily regimen of Emgality with low-dose topiramate. Previously on Botox but difficult transportation to get here so we have deferred that for now. In the last 2 weeks her headaches have worsened dramatically daily migrainous pounding symptoms with light sensitivity and nausea vomiting. She went to the ER locally and had a head CT which was negative. Prior to 2 weeks she was on her usual regimen of two or 3 days a week not as severe taking sumatriptan. No unusual numbness weakness or vision changes. She just took Emgality recently as scheduled. 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 Background:  Shantal is a 82-year-old woman who has a history of migraine headaches and hypertension. She has had migraine headaches ever since age 18 which in the past couple years has become a daily event described as either right or left-sided slightly more on the left pulsing throbbing pain with light and sound sensitivity and worse with movement. When she has severe escalation she has nausea and vomiting. The other reason why she is here is about 3 weeks ago she had sudden changes in her vision bilaterally described as depth changes possibly some hypersensitivity to light. She went to see ophthalmology whose notes I reviewed and she had normal optic discs but they were concerned about a left homonymous hemianopsia, but I do not have any VF tests to review. She tells me she had MRI brain done last week or so does not know the results. She still has a headache every day. No unusual numbness or weakness. Depression is a concern. She is on Lexapro for depression. She has EL but is not tolerating the NC. Review of Systems   Eyes: Positive for photophobia.  Negative for double vision. Gastrointestinal: Positive for nausea and vomiting. Neurological: Positive for headaches. All other systems reviewed and are negative.       Past Medical History:   Diagnosis Date    Anxiety disorder     Depression     Diabetes ([facility] [address]     Headache     Hypertension      Family History   Problem Relation Age of Onset    Cancer Mother      Social History     Socioeconomic History    Marital status:      Spouse name: Not on file    Number of children: Not on file    Years of education: Not on file    Highest education level: Not on file   Occupational History    Not on file   Social Needs    Financial resource strain: Not on file    Food insecurity     Worry: Not on file     Inability: Not on file    Transportation needs     Medical: Not on file     Non-medical: Not on file   Tobacco Use    Smoking status: Current Some Day Smoker     Packs/day: 0.50    Smokeless tobacco: Never Used   Substance and Sexual Activity    Alcohol use: No     Frequency: Never    Drug use: No    Sexual activity: Yes     Partners: Male   Lifestyle    Physical activity     Days per week: Not on file     Minutes per session: Not on file    Stress: Not on file   Relationships    Social connections     Talks on phone: Not on file     Gets together: Not on file     Attends [employer] service: Not on file     Active member of club or organization: Not on file     Attends meetings of clubs or organizations: Not on file     Relationship status: Not on file    Intimate partner violence     Fear of current or ex partner: Not on file     Emotionally abused: Not on file     Physically abused: Not on file     Forced sexual activity: Not on file   Other Topics Concern    Not on file   Social History Narrative    ** Merged History Encounter **          Allergies   Allergen Reactions    Latex Swelling     RASH and THROAT SWELLING      Garlic Hives    Pcn [Penicillins] Swelling     Eyes swell shut      Penicillins Unknown (comments)    Sulfa (Sulfonamide Antibiotics) Swelling     Eyes swell shut      Sulfa (Sulfonamide Antibiotics) Unknown (comments)         Current Outpatient Medications   Medication Sig    busPIRone (BUSPAR) 7.5 mg tablet     butalbital-acetaminophen-caffeine (FIORICET, ESGIC) -40 mg per tablet butalbital-acetaminophen-caffeine 50 mg-325 mg-40 mg tablet    zolpidem (AMBIEN) 5 mg tablet TAKE 1 TABLET BY MOUTH ONCE DAILY ON EMPTY STOMACH AT LEAST 7 HOURS BEFORE PLANNED AWAKENING    topiramate ER (Trokendi XR) 200 mg capsule Take 1 Cap by mouth daily.  methylPREDNISolone (MEDROL DOSEPACK) 4 mg tablet Per package directions    promethazine (PHENERGAN) 25 mg tablet Take 1 Tab by mouth every eight (8) hours as needed for Nausea.  rimegepant (Nurtec ODT) 75 mg disintegrating tablet Take 1 Tab by mouth daily as needed for Migraine.  topiramate ER (Trokendi XR) 100 mg capsule Take 2 Caps by mouth daily.  SUMAtriptan (IMITREX) 100 mg tablet TAKE 1 TABLET BY MOUTH AS NEEDED FOR MIGRAINE FOR UP TO 1 DOSE    Emgality Pen 120 mg/mL injection INJECT 1 SYRINGE SUBCUTANEOUSLY ONCE EVERY MONTH**EVERY 30 DAYS**    escitalopram oxalate (LEXAPRO) 20 mg tablet Take 1 Tab by mouth daily.  ondansetron (ZOFRAN ODT) 4 mg disintegrating tablet Take 1 Tab by mouth every eight (8) hours as needed for Nausea, Vomiting or Nausea or Vomiting.  aspirin delayed-release 81 mg tablet Take  by mouth daily.  furosemide (LASIX) 20 mg tablet Take  by mouth daily.  lisinopril (PRINIVIL, ZESTRIL) 10 mg tablet Take 20 mg by mouth daily.  metoprolol tartrate (LOPRESSOR) 25 mg tablet Take  by mouth two (2) times a day.  atorvastatin (LIPITOR) 40 mg tablet Take  by mouth daily.  Omeprazole delayed release (PRILOSEC D/R) 20 mg tablet Take 20 mg by mouth daily.  predniSONE (DELTASONE) 20 mg tablet Take  by mouth two (2) times a day.     albuterol (PROVENTIL HFA, VENTOLIN HFA, PROAIR HFA) 90 mcg/actuation inhaler Take  by inhalation.  codeine-butalbital-acetaminophen-caffeine (FIORICET WITH CODEINE) -94-30 mg capsule Take  by mouth.  galcanezumab-gnlm (EMGALITY PEN) 120 mg/mL injection 1 mL by SubCUTAneous route every thirty (30) days. Indications: Migraine Prevention     No current facility-administered medications for this visit. Neurologic Exam     Mental Status   WD/WN adult in NAD, normal grooming  VSS  A&O x 3    PERRL, nonicteric  Face is symmetric, tongue midline  Speech is fluent and clear  No limb ataxia. No abnl movements. Moving all extemities spontaneously and symmetric  Normal gait             Visit Vitals  /84 (BP 1 Location: Left arm, BP Patient Position: Sitting)   Pulse 84   Ht 5' 5\" (1.651 m)   Wt 262 lb (118.8 kg)   LMP 11/15/2019   SpO2 98%   BMI 43.60 kg/m²       Assessment and Plan   Diagnoses and all orders for this visit:    1. Intractable chronic migraine without aura and without status migrainosus    Other orders  -     topiramate ER (Trokendi XR) 200 mg capsule; Take 1 Cap by mouth daily. -     methylPREDNISolone (MEDROL DOSEPACK) 4 mg tablet; Per package directions  -     promethazine (PHENERGAN) 25 mg tablet; Take 1 Tab by mouth every eight (8) hours as needed for Nausea. -     rimegepant (Nurtec ODT) 75 mg disintegrating tablet; Take 1 Tab by mouth daily as needed for Migraine.  -     topiramate ER (Trokendi XR) 100 mg capsule; Take 2 Caps by mouth daily.  -     SUMAtriptan (IMITREX) 100 mg tablet; TAKE 1 TABLET BY MOUTH AS NEEDED FOR MIGRAINE FOR UP TO 1 DOSE      28-year-old woman who has chronic migraine now in the last 2 weeks with a significant exacerbation. Imaging normal she tells me so she was released from the ER. Could be having a severe breakthrough migraine in the setting of weather changes and stressors. Exam unrevealing. I am going to have her increase topiramate to the once daily ER version 200 mg daily.   Steroid Dosepak to start and complete. Nurtec acutely to avoid overusing sumatriptan . Continue Emgality. It is giving her some benefit. If she does not have any improvement in the next week I would like her to let us know. Any unusual numbness weakness vision changes please return to the ER. I will see her in follow-up routinely or sooner if something changes acutely. I reviewed and decided to continue the current medications. This clinical note was dictated with an electronic dictation software that can make unintentional errors. If there are any questions, please contact me directly for clarification.       2 [facility], [facility] [address]  Diplomate ABPN

## 2021-01-14 NOTE — PATIENT INSTRUCTIONS
Additional instructions:    Stop taking generic topiramate. Please start topiramate extended release 200 mg daily.

## 2021-01-14 NOTE — LETTER
1/14/2021 Patient: Kaylah Joseph YOB: 1975 Date of Visit: 1/14/2021 Will Billingsley NP 
[facility] [address] Via Fax: [phone] Dear Will Billingsley NP, Thank you for referring Ms. Darlean Fothergill to [address] [place] [address] for evaluation. My notes for this consultation are attached. If you have questions, please do not hesitate to call me. I look forward to following your patient along with you. Sincerely, [address] [place] [address], DO 
 
1/14/2021 Patient:  Kaylah Joseph YOB: 1975 Date of Visit: 1/14/2021 Dear MARIA C Nelsonzack [id number] Via Fax: [phone]: I was requested by MARIA C Hughes to evaluate Ms. Darlean Fothergill  for Chief Complaint Patient presents with  Migraine  
  follow up \"not doing great, when I move my head spins. \" Gallito Reas I am recommending the following:  
 
Diagnoses and all orders for this visit: 
 
1. Intractable chronic migraine without aura and without status migrainosus Other orders -     topiramate ER (Trokendi XR) 200 mg capsule; Take 1 Cap by mouth daily. -     methylPREDNISolone (MEDROL DOSEPACK) 4 mg tablet; Per package directions -     promethazine (PHENERGAN) 25 mg tablet; Take 1 Tab by mouth every eight (8) hours as needed for Nausea. -     rimegepant (Nurtec ODT) 75 mg disintegrating tablet; Take 1 Tab by mouth daily as needed for Migraine. 
-     topiramate ER (Trokendi XR) 100 mg capsule; Take 2 Caps by mouth daily. 
-     SUMAtriptan (IMITREX) 100 mg tablet; TAKE 1 TABLET BY MOUTH AS NEEDED FOR MIGRAINE FOR UP TO 1 DOSE 
 
 
 
---------------------------------------------------------------------------------------------------------------------- Below is my encounter: Chief Complaint Patient presents with  Migraine  
  follow up \"not doing great, when I move my head spins. \"  
 
 
HPI 
 
 Shantal is a 61-year-old woman following up. She has chronic migraine headaches. She is on a daily regimen of Emgality with low-dose topiramate. Previously on Botox but difficult transportation to get here so we have deferred that for now. In the last 2 weeks her headaches have worsened dramatically daily migrainous pounding symptoms with light sensitivity and nausea vomiting. She went to the ER locally and had a head CT which was negative. Prior to 2 weeks she was on her usual regimen of two or 3 days a week not as severe taking sumatriptan. No unusual numbness weakness or vision changes. She just took Emgality recently as scheduled. 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 Background: 
Shantal is a 46-year-old woman who has a history of migraine headaches and hypertension. She has had migraine headaches ever since age 18 which in the past couple years has become a daily event described as either right or left-sided slightly more on the left pulsing throbbing pain with light and sound sensitivity and worse with movement. When she has severe escalation she has nausea and vomiting. The other reason why she is here is about 3 weeks ago she had sudden changes in her vision bilaterally described as depth changes possibly some hypersensitivity to light. She went to see ophthalmology whose notes I reviewed and she had normal optic discs but they were concerned about a left homonymous hemianopsia, but I do not have any VF tests to review. She tells me she had MRI brain done last week or so does not know the results. She still has a headache every day. No unusual numbness or weakness. Depression is a concern. She is on Lexapro for depression. She has EL but is not tolerating the NC. Review of Systems Eyes: Positive for photophobia. Negative for double vision. Gastrointestinal: Positive for nausea and vomiting. Neurological: Positive for headaches. All other systems reviewed and are negative. Past Medical History:  
Diagnosis Date  Anxiety disorder  Depression  Diabetes ([facility] [address]  Headache  Hypertension Family History Problem Relation Age of Onset  Cancer Mother Social History Socioeconomic History  Marital status:  Spouse name: Not on file  Number of children: Not on file  Years of education: Not on file  Highest education level: Not on file Occupational History  Not on file Social Needs  Financial resource strain: Not on file  Food insecurity Worry: Not on file Inability: Not on file  Transportation needs Medical: Not on file Non-medical: Not on file Tobacco Use  Smoking status: Current Some Day Smoker Packs/day: 0.50  Smokeless tobacco: Never Used Substance and Sexual Activity  Alcohol use: No  
  Frequency: Never  Drug use: No  
 Sexual activity: Yes  
  Partners: Male Lifestyle  Physical activity Days per week: Not on file Minutes per session: Not on file  Stress: Not on file Relationships  Social connections Talks on phone: Not on file Gets together: Not on file Attends Buddhism service: Not on file Active member of club or organization: Not on file Attends meetings of clubs or organizations: Not on file Relationship status: Not on file  Intimate partner violence Fear of current or ex partner: Not on file Emotionally abused: Not on file Physically abused: Not on file Forced sexual activity: Not on file Other Topics Concern  Not on file Social History Narrative ** Merged History Encounter ** Allergies Allergen Reactions  Latex Swelling RASH and THROAT SWELLING 
  
 Garlic Hives  Pcn [Penicillins] Swelling Eyes swell shut  Penicillins Unknown (comments)  Sulfa (Sulfonamide Antibiotics) Swelling Eyes swell shut  Sulfa (Sulfonamide Antibiotics) Unknown (comments) Current Outpatient Medications Medication Sig  
 busPIRone (BUSPAR) 7.5 mg tablet  butalbital-acetaminophen-caffeine (FIORICET, ESGIC) -40 mg per tablet butalbital-acetaminophen-caffeine 50 mg-325 mg-40 mg tablet  zolpidem (AMBIEN) 5 mg tablet TAKE 1 TABLET BY MOUTH ONCE DAILY ON EMPTY STOMACH AT LEAST 7 HOURS BEFORE PLANNED AWAKENING  
 topiramate ER (Trokendi XR) 200 mg capsule Take 1 Cap by mouth daily.  methylPREDNISolone (MEDROL DOSEPACK) 4 mg tablet Per package directions  promethazine (PHENERGAN) 25 mg tablet Take 1 Tab by mouth every eight (8) hours as needed for Nausea.  rimegepant (Nurtec ODT) 75 mg disintegrating tablet Take 1 Tab by mouth daily as needed for Migraine.  topiramate ER (Trokendi XR) 100 mg capsule Take 2 Caps by mouth daily.  SUMAtriptan (IMITREX) 100 mg tablet TAKE 1 TABLET BY MOUTH AS NEEDED FOR MIGRAINE FOR UP TO 1 DOSE  Emgality Pen 120 mg/mL injection INJECT 1 SYRINGE SUBCUTANEOUSLY ONCE EVERY MONTH**EVERY 30 DAYS**  
 escitalopram oxalate (LEXAPRO) 20 mg tablet Take 1 Tab by mouth daily.  ondansetron (ZOFRAN ODT) 4 mg disintegrating tablet Take 1 Tab by mouth every eight (8) hours as needed for Nausea, Vomiting or Nausea or Vomiting.  aspirin delayed-release 81 mg tablet Take  by mouth daily.  furosemide (LASIX) 20 mg tablet Take  by mouth daily.  lisinopril (PRINIVIL, ZESTRIL) 10 mg tablet Take 20 mg by mouth daily.  metoprolol tartrate (LOPRESSOR) 25 mg tablet Take  by mouth two (2) times a day.  atorvastatin (LIPITOR) 40 mg tablet Take  by mouth daily.  Omeprazole delayed release (PRILOSEC D/R) 20 mg tablet Take 20 mg by mouth daily.  predniSONE (DELTASONE) 20 mg tablet Take  by mouth two (2) times a day.  albuterol (PROVENTIL HFA, VENTOLIN HFA, PROAIR HFA) 90 mcg/actuation inhaler Take  by inhalation.  codeine-butalbital-acetaminophen-caffeine (FIORICET WITH CODEINE) -65-30 mg capsule Take  by mouth.  galcanezumab-gnlm (EMGALITY PEN) 120 mg/mL injection 1 mL by SubCUTAneous route every thirty (30) days. Indications: Migraine Prevention No current facility-administered medications for this visit. Neurologic Exam  
 
Mental Status WD/WN adult in NAD, normal grooming VSS 
A&O x 3 PERRL, nonicteric Face is symmetric, tongue midline Speech is fluent and clear No limb ataxia. No abnl movements. Moving all extemities spontaneously and symmetric Normal gait Visit Vitals /84 (BP 1 Location: Left arm, BP Patient Position: Sitting) Pulse 84 Ht 5' 5\" (1.651 m) Wt 262 lb (118.8 kg) LMP 11/15/2019 SpO2 98% BMI 43.60 kg/m² Assessment and Plan Diagnoses and all orders for this visit: 
 
1. Intractable chronic migraine without aura and without status migrainosus Other orders -     topiramate ER (Trokendi XR) 200 mg capsule; Take 1 Cap by mouth daily. -     methylPREDNISolone (MEDROL DOSEPACK) 4 mg tablet; Per package directions -     promethazine (PHENERGAN) 25 mg tablet; Take 1 Tab by mouth every eight (8) hours as needed for Nausea. -     rimegepant (Nurtec ODT) 75 mg disintegrating tablet; Take 1 Tab by mouth daily as needed for Migraine. 
-     topiramate ER (Trokendi XR) 100 mg capsule;  Take 2 Caps by mouth daily. 
-     SUMAtriptan (IMITREX) 100 mg tablet; TAKE 1 TABLET BY MOUTH AS NEEDED FOR MIGRAINE FOR UP TO 1 DOSE 
 
 
 19-year-old woman who has chronic migraine now in the last 2 weeks with a significant exacerbation. Imaging normal she tells me so she was released from the ER. Could be having a severe breakthrough migraine in the setting of weather changes and stressors. Exam unrevealing. I am going to have her increase topiramate to the once daily ER version 200 mg daily. Steroid Dosepak to start and complete. Nurtec acutely to avoid overusing sumatriptan . Continue Emgality. It is giving her some benefit. If she does not have any improvement in the next week I would like her to let us know. Any unusual numbness weakness vision changes please return to the ER. I will see her in follow-up routinely or sooner if something changes acutely. I reviewed and decided to continue the current medications. This clinical note was dictated with an electronic dictation software that can make unintentional errors. If there are any questions, please contact me directly for clarification. Thank you for giving me the opportunity to assist in the care of Ms. Damon People. If you have questions, please do not hesitate to contact me. Sincerely, 2 [facility], DO Neurologist 
Brain Injury Medicine Diplomate RACHEL

## 2021-01-19 ENCOUNTER — TELEPHONE (OUTPATIENT)
Dept: NEUROLOGY | Age: 46
End: 2021-01-19

## 2021-01-19 RX ORDER — RIZATRIPTAN BENZOATE 10 MG/1
10 TABLET, ORALLY DISINTEGRATING ORAL
Qty: 9 TAB | Refills: 1 | Status: SHIPPED | OUTPATIENT
Start: 2021-01-19 | End: 2021-01-19

## 2021-01-19 NOTE — TELEPHONE ENCOUNTER
Re: Dawn Lucero denied     Denial rec'd from [facility] via [facility]    Per your health plan's criteria, this drug is covered if you meet the following:  (1) One of the following:  (a) You have tried two triptans (for example: eletriptan, rizatriptan, sumatriptan). (b) You cannot use all triptans. *Sumatriptan has been noted. Pt has a commercial plan and is eligible to use the co-pay for first fill, insurance must pay after month 1.

## 2021-01-29 ENCOUNTER — TELEPHONE (OUTPATIENT)
Dept: NEUROLOGY | Age: 46
End: 2021-01-29

## 2021-01-29 NOTE — TELEPHONE ENCOUNTER
----- Message from Norman Suggs sent at 1/29/2021  3:28 PM EST -----  Regarding: DO [place]/Telephone  General Message/Vendor Calls    Caller's first and last name: Sintia [facility]      Reason for call: Checking on [place] for Nurtec Medication       Callback required yes/no and why: yes      Best contact number(s): [phone] ref # [id number]      Details to clarify the request:      Norman Suggs

## 2021-02-01 NOTE — TELEPHONE ENCOUNTER
Re: Nurtec    Request has been denied, patient does not meet medical criteria. Provider has prescribed an alternative.

## 2021-03-30 ENCOUNTER — TELEPHONE (OUTPATIENT)
Dept: NEUROLOGY | Age: 46
End: 2021-03-30

## 2021-03-30 NOTE — TELEPHONE ENCOUNTER
Re: Emgality approved    [facility] via [facility]    Effective 03/30/21-03/30/22  PA # [id number]

## 2021-06-23 ENCOUNTER — OFFICE VISIT (OUTPATIENT)
Dept: NEUROLOGY | Age: 46
End: 2021-06-23
Payer: COMMERCIAL

## 2021-06-23 VITALS
SYSTOLIC BLOOD PRESSURE: 134 MMHG | BODY MASS INDEX: 46.15 KG/M2 | WEIGHT: 277 LBS | OXYGEN SATURATION: 98 % | HEIGHT: 65 IN | DIASTOLIC BLOOD PRESSURE: 88 MMHG

## 2021-06-23 DIAGNOSIS — G43.719 INTRACTABLE CHRONIC MIGRAINE WITHOUT AURA AND WITHOUT STATUS MIGRAINOSUS: Primary | ICD-10-CM

## 2021-06-23 PROCEDURE — 99214 OFFICE O/P EST MOD 30 MIN: CPT | Performed by: PSYCHIATRY & NEUROLOGY

## 2021-06-23 RX ORDER — POLYETHYLENE GLYCOL 3350 17 G/17G
17 POWDER, FOR SOLUTION ORAL
COMMUNITY

## 2021-06-23 RX ORDER — ESCITALOPRAM OXALATE 20 MG/1
20 TABLET ORAL DAILY
Qty: 90 TABLET | Refills: 0 | Status: SHIPPED | OUTPATIENT
Start: 2021-06-23 | End: 2022-07-23 | Stop reason: SDUPTHER

## 2021-06-23 RX ORDER — LIDOCAINE 50 MG/G
PATCH TOPICAL
COMMUNITY
End: 2021-11-08

## 2021-06-23 RX ORDER — MIRTAZAPINE 15 MG/1
15 TABLET, FILM COATED ORAL
Qty: 90 TABLET | Refills: 1 | Status: SHIPPED | OUTPATIENT
Start: 2021-06-23

## 2021-06-23 RX ORDER — GALCANEZUMAB 120 MG/ML
120 INJECTION, SOLUTION SUBCUTANEOUS
Qty: 1 ML | Refills: 10 | Status: SHIPPED | OUTPATIENT
Start: 2021-06-23 | End: 2021-11-08

## 2021-06-23 RX ORDER — NAPROXEN 500 MG/1
TABLET ORAL
COMMUNITY
End: 2021-11-08

## 2021-06-23 RX ORDER — FAMOTIDINE 20 MG/1
TABLET, FILM COATED ORAL
COMMUNITY

## 2021-06-23 RX ORDER — RIMEGEPANT SULFATE 75 MG/75MG
75 TABLET, ORALLY DISINTEGRATING ORAL EVERY OTHER DAY
Qty: 15 TABLET | Refills: 5 | Status: SHIPPED | OUTPATIENT
Start: 2021-06-23 | End: 2021-11-08

## 2021-06-23 RX ORDER — PROMETHAZINE HYDROCHLORIDE 50 MG/1
50 SUPPOSITORY RECTAL
Qty: 10 SUPPOSITORY | Refills: 3 | Status: SHIPPED | OUTPATIENT
Start: 2021-06-23 | End: 2021-11-08

## 2021-06-23 RX ORDER — RIZATRIPTAN BENZOATE 10 MG/1
TABLET, ORALLY DISINTEGRATING ORAL
COMMUNITY
Start: 2021-04-11 | End: 2021-06-23

## 2021-06-23 NOTE — PROGRESS NOTES
Chief Complaint   Patient presents with    Follow-up     migraine \"I still get them a couple of times a week. \"     Visit Vitals  /88 (BP 1 Location: Left upper arm, BP Patient Position: Sitting)   Ht 5' 5\" (1.651 m)   Wt 277 lb (125.6 kg)   SpO2 98%   BMI 46.10 kg/m²

## 2021-06-23 NOTE — PROGRESS NOTES
Chief Complaint   Patient presents with    Follow-up     migraine \"I still get them a couple of times a week. \"       HPI        Shantal is a 81-year-old woman following up. She has chronic migraine headaches. She is on a daily regimen of Emgality with  topiramate and Lexapro. Previously on Botox but difficult transportation to get here so we have deferred that for now. Since I saw her last we attempted to have Nurtec approved but she had to use rizatriptan first.  She tried it and rizatriptan failed. Sumatriptan failed. Yesterday she had severe nausea and vomiting despite oral Phenergan. Headaches are still daily but less intense with Emgality on board as well as Trokendi but she can have frequent escalations 2 or 3 times a week that are debilitating. 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 Sleep is very poor but hopefully will be off night shift soon. Background:  Shantal is a 65-year-old woman who has a history of migraine headaches and hypertension. She has had migraine headaches ever since age 18 which in the past couple years has become a daily event described as either right or left-sided slightly more on the left pulsing throbbing pain with light and sound sensitivity and worse with movement. When she has severe escalation she has nausea and vomiting. The other reason why she is here is about 3 weeks ago she had sudden changes in her vision bilaterally described as depth changes possibly some hypersensitivity to light. She went to see ophthalmology whose notes I reviewed and she had normal optic discs but they were concerned about a left homonymous hemianopsia, but I do not have any VF tests to review. She tells me she had MRI brain done last week or so does not know the results. She still has a headache every day. No unusual numbness or weakness. Depression is a concern. She is on Lexapro for depression. She has EL but is not tolerating the NC.           Review of Systems Gastrointestinal: Positive for nausea and vomiting. Neurological: Positive for headaches. Psychiatric/Behavioral: The patient has insomnia. All other systems reviewed and are negative. Past Medical History:   Diagnosis Date    Anxiety disorder     Depression     Diabetes ([address]     Headache     Hypertension      Family History   Problem Relation Age of Onset    Cancer Mother      Social History     Socioeconomic History    Marital status:      Spouse name: Not on file    Number of children: Not on file    Years of education: Not on file    Highest education level: Not on file   Occupational History    Not on file   Tobacco Use    Smoking status: Former Smoker     Packs/day: 0.50     Quit date: 3/8/2021     Years since quittin.2    Smokeless tobacco: Never Used   Vaping Use    Vaping Use: Never used   Substance and Sexual Activity    Alcohol use: No    Drug use: No    Sexual activity: Yes     Partners: Male   Other Topics Concern    Not on file   Social History Narrative    ** Merged History Encounter **          Social Determinants of Health     Financial Resource Strain:     Difficulty of Paying Living Expenses:    Food Insecurity:     Worried About Running Out of Food in the Last Year:     Ran Out of Food in the Last Year:    Transportation Needs:     Lack of Transportation (Medical):      Lack of Transportation (Non-Medical):    Physical Activity:     Days of Exercise per Week:     Minutes of Exercise per Session:    Stress:     Feeling of Stress :    Social Connections:     Frequency of Communication with Friends and Family:     Frequency of Social Gatherings with Friends and Family:     Attends Yazidism Services:     Active Member of Clubs or Organizations:     Attends Club or Organization Meetings:     Marital Status:    Intimate Partner Violence:     Fear of Current or Ex-Partner:     Emotionally Abused:     Physically Abused:     Sexually Abused: Allergies   Allergen Reactions    Latex Swelling     RASH and THROAT SWELLING      Garlic Hives    Pcn [Penicillins] Swelling     Eyes swell shut      Penicillins Unknown (comments)    Sulfa (Sulfonamide Antibiotics) Swelling     Eyes swell shut      Sulfa (Sulfonamide Antibiotics) Unknown (comments)         Current Outpatient Medications   Medication Sig    polyethylene glycol (Miralax) 17 gram/dose powder 17 g.  famotidine (PEPCID) 20 mg tablet famotidine 20 mg tablet    galcanezumab-gnlm (Emgality Pen) 120 mg/mL injection 1 mL by SubCUTAneous route every thirty (30) days. Indications: migraine prevention    escitalopram oxalate (LEXAPRO) 20 mg tablet Take 1 Tablet by mouth daily.  mirtazapine (REMERON) 15 mg tablet Take 1 Tablet by mouth nightly.  promethazine (PHENERGAN) 50 mg suppository Insert 1 Suppository into rectum every eight (8) hours as needed for Nausea.  rimegepant (Nurtec ODT) 75 mg disintegrating tablet Take 1 Tablet by mouth every other day.  butalbital-acetaminophen-caffeine (FIORICET, ESGIC) -40 mg per tablet butalbital-acetaminophen-caffeine 50 mg-325 mg-40 mg tablet    zolpidem (AMBIEN) 5 mg tablet TAKE 1 TABLET BY MOUTH ONCE DAILY ON EMPTY STOMACH AT LEAST 7 HOURS BEFORE PLANNED AWAKENING    topiramate ER (Trokendi XR) 200 mg capsule Take 1 Cap by mouth daily.  SUMAtriptan (IMITREX) 100 mg tablet TAKE 1 TABLET BY MOUTH AS NEEDED FOR MIGRAINE FOR UP TO 1 DOSE    ondansetron (ZOFRAN ODT) 4 mg disintegrating tablet Take 1 Tab by mouth every eight (8) hours as needed for Nausea, Vomiting or Nausea or Vomiting.  albuterol (PROVENTIL HFA, VENTOLIN HFA, PROAIR HFA) 90 mcg/actuation inhaler Take  by inhalation.  aspirin delayed-release 81 mg tablet Take  by mouth daily.  furosemide (LASIX) 20 mg tablet Take  by mouth daily.  lisinopril (PRINIVIL, ZESTRIL) 10 mg tablet Take 20 mg by mouth daily.     metoprolol tartrate (LOPRESSOR) 25 mg tablet Take by mouth two (2) times a day.  atorvastatin (LIPITOR) 40 mg tablet Take  by mouth daily.  Omeprazole delayed release (PRILOSEC D/R) 20 mg tablet Take 20 mg by mouth daily.  naproxen (NAPROSYN) 500 mg tablet naproxen 500 mg tablet (Patient not taking: Reported on 6/23/2021)    lidocaine (LIDODERM) 5 % lidocaine 5 % topical patch   as needed (Patient not taking: Reported on 6/23/2021)    busPIRone (BUSPAR) 7.5 mg tablet  (Patient not taking: Reported on 6/23/2021)    methylPREDNISolone (MEDROL DOSEPACK) 4 mg tablet Per package directions (Patient not taking: Reported on 6/23/2021)    promethazine (PHENERGAN) 25 mg tablet Take 1 Tab by mouth every eight (8) hours as needed for Nausea. (Patient not taking: Reported on 6/23/2021)    predniSONE (DELTASONE) 20 mg tablet Take  by mouth two (2) times a day. (Patient not taking: Reported on 6/23/2021)    codeine-butalbital-acetaminophen-caffeine (FIORICET WITH CODEINE) -11-30 mg capsule Take  by mouth. (Patient not taking: Reported on 6/23/2021)     No current facility-administered medications for this visit. Neurologic Exam     Mental Status   WD/WN adult in NAD, normal grooming  VSS  A&O x 3    PERRL, nonicteric  Face is symmetric, tongue midline  Speech is  clear  No limb ataxia. No abnl movements. Moving all extemities spontaneously and symmetric  Normal gait             Visit Vitals  /88 (BP 1 Location: Left upper arm, BP Patient Position: Sitting)   Ht 5' 5\" (1.651 m)   Wt 277 lb (125.6 kg)   LMP 11/15/2019   SpO2 98%   BMI 46.10 kg/m²       Assessment and Plan   Diagnoses and all orders for this visit:    1. Intractable chronic migraine without aura and without status migrainosus    Other orders  -     galcanezumab-gnlm (Emgality Pen) 120 mg/mL injection; 1 mL by SubCUTAneous route every thirty (30) days. Indications: migraine prevention  -     escitalopram oxalate (LEXAPRO) 20 mg tablet; Take 1 Tablet by mouth daily.   - mirtazapine (REMERON) 15 mg tablet; Take 1 Tablet by mouth nightly. -     promethazine (PHENERGAN) 50 mg suppository; Insert 1 Suppository into rectum every eight (8) hours as needed for Nausea. -     rimegepant (Nurtec ODT) 75 mg disintegrating tablet; Take 1 Tablet by mouth every other day. 82-year-old woman who has chronic migraine with some mild stability using Emgality and Lexapro and Trokendi which we will continue. However she is having severe debilitating breakthrough some going to augment by adding Nurtec every other day and I have sent this to her mail order pharmacy. She has already failed rizatriptan and sumatriptan. I am also going to give her Phenergan suppositories when she has active vomiting. Optimize her sleep as she is stopping the night shift soon which hopefully will help. I am going to give her some Remeron to help with sleep. We may have to resume Botox at some point if necessary. 30 minutes of time was spent reviewing the medical record today particularly all of the current medications, face-to-face time discussing worsening symptoms, completion of documentation. I reviewed and decided to continue the current medications. This clinical note was dictated with an electronic dictation software that can make unintentional errors. If there are any questions, please contact me directly for clarification.       [address] [facility], [facility] [address]  Diplomate RACHEL

## 2021-07-12 ENCOUNTER — TELEPHONE (OUTPATIENT)
Dept: NEUROLOGY | Age: 46
End: 2021-07-12

## 2021-07-12 NOTE — TELEPHONE ENCOUNTER
Re: Nurtec    Rcvd 55 pg fax from [facility] with Duplicate clinical questions, submitted through [facility] site. Awaiting update.

## 2021-08-11 ENCOUNTER — TELEPHONE (OUTPATIENT)
Dept: NEUROLOGY | Age: 46
End: 2021-08-11

## 2021-08-18 NOTE — TELEPHONE ENCOUNTER
Re: Nurtec    No response received, see a new PA request was created in [facility], completed through site and awaiting update.

## 2021-11-08 ENCOUNTER — OFFICE VISIT (OUTPATIENT)
Dept: NEUROLOGY | Age: 46
End: 2021-11-08
Payer: COMMERCIAL

## 2021-11-08 VITALS
HEART RATE: 82 BPM | OXYGEN SATURATION: 97 % | HEIGHT: 65 IN | DIASTOLIC BLOOD PRESSURE: 88 MMHG | BODY MASS INDEX: 43.99 KG/M2 | SYSTOLIC BLOOD PRESSURE: 144 MMHG | WEIGHT: 264 LBS

## 2021-11-08 DIAGNOSIS — G43.719 INTRACTABLE CHRONIC MIGRAINE WITHOUT AURA AND WITHOUT STATUS MIGRAINOSUS: Primary | ICD-10-CM

## 2021-11-08 PROCEDURE — 99214 OFFICE O/P EST MOD 30 MIN: CPT | Performed by: PSYCHIATRY & NEUROLOGY

## 2021-11-08 RX ORDER — OLOPATADINE HYDROCHLORIDE 2 MG/ML
SOLUTION/ DROPS OPHTHALMIC
COMMUNITY
Start: 2021-10-22 | End: 2021-11-08

## 2021-11-08 RX ORDER — APIXABAN 5 MG/1
TABLET, FILM COATED ORAL
COMMUNITY
Start: 2021-08-13 | End: 2021-11-08

## 2021-11-08 RX ORDER — RIZATRIPTAN BENZOATE 10 MG/1
TABLET, ORALLY DISINTEGRATING ORAL
COMMUNITY
End: 2021-11-08

## 2021-11-08 RX ORDER — RIMEGEPANT SULFATE 75 MG/75MG
75 TABLET, ORALLY DISINTEGRATING ORAL EVERY OTHER DAY
Qty: 18 TABLET | Refills: 5 | Status: SHIPPED | OUTPATIENT
Start: 2021-11-08

## 2021-11-08 RX ORDER — CETIRIZINE HCL 10 MG
TABLET ORAL
COMMUNITY
Start: 2021-10-22

## 2021-11-08 NOTE — LETTER
11/8/2021    Patient: Trina Portillo   YOB: 1975   Date of Visit: 11/8/2021     Laura Roldan NP  [facility] [address]  Via Fax: [phone]    Dear Laura Roldan NP,      Thank you for referring Ms. Shanelle Yuan to [address] [place] [address] for evaluation. My notes for this consultation are attached. If you have questions, please do not hesitate to call me. I look forward to following your patient along with you. Sincerely,    [address] [place] [address], DO    11/8/2021     Patient:  Trina Portillo   YOB: 1975  Date of Visit: 11/8/2021      Dear Laura Roldan NP  [facility] [address]  Via Fax: [phone]:      I was requested by MARIA C Hughes to evaluate Ms. Shanelle Yuan  for   Chief Complaint   Patient presents with    Follow-up     migraine, \" they have been so so. \"   . I am recommending the following:     Diagnoses and all orders for this visit:    1. Intractable chronic migraine without aura and without status migrainosus    Other orders  -     topiramate ER (Trokendi XR) 200 mg capsule; Take 1 Capsule by mouth daily. -     rimegepant (Nurtec ODT) 75 mg disintegrating tablet; Take 1 Tablet by mouth every other day.        ----------------------------------------------------------------------------------------------------------------------  Below is my encounter:       Chief Complaint   Patient presents with    Follow-up     migraine, \" they have been so so. \"       STEVE Murguia is a 56-year-old woman following up. She has chronic migraine headaches. She is on a daily regimen of Emgality with  topiramate and Lexapro. Unfortunately there has been difficulty with coverage getting her medications. She has not had Emgality. She remains on topiramate extended release as well as Lexapro. We tried to get Nurtec and she only received a small supply. Nurtec was very beneficial for her.   Since I saw her last she continues to have increasing personal stressors now where she is working in a job where she has a little help. She is contemplating leaving that job. As a result of that decision likely, she will lose coverage. She is having daily migrainous symptoms despite her treatment. She is also having some tenderness in the left occipital region. She thinks she has a double ear infection right now. Background:  Shantal is a 44-year-old woman who has a history of migraine headaches and hypertension. She has had migraine headaches ever since age 18 which in the past couple years has become a daily event described as either right or left-sided slightly more on the left pulsing throbbing pain with light and sound sensitivity and worse with movement. When she has severe escalation she has nausea and vomiting. The other reason why she is here is about 3 weeks ago she had sudden changes in her vision bilaterally described as depth changes possibly some hypersensitivity to light. She went to see ophthalmology whose notes I reviewed and she had normal optic discs but they were concerned about a left homonymous hemianopsia, but I do not have any VF tests to review. She tells me she had MRI brain done last week or so does not know the results. She still has a headache every day. No unusual numbness or weakness. Depression is a concern. She is on Lexapro for depression. She has EL but is not tolerating the NC. Review of Systems   Gastrointestinal: Positive for nausea and vomiting. Neurological: Positive for headaches. Psychiatric/Behavioral: Positive for depression. Negative for suicidal ideas. The patient has insomnia. All other systems reviewed and are negative.       Past Medical History:   Diagnosis Date    Anxiety disorder     Depression     Diabetes ([address]     Headache     Hypertension      Family History   Problem Relation Age of Onset    Cancer Mother      Social History Socioeconomic History    Marital status:      Spouse name: Not on file    Number of children: Not on file    Years of education: Not on file    Highest education level: Not on file   Occupational History    Not on file   Tobacco Use    Smoking status: Former Smoker     Packs/day: 0.50     Quit date: 3/8/2021     Years since quittin.6    Smokeless tobacco: Never Used   Vaping Use    Vaping Use: Never used   Substance and Sexual Activity    Alcohol use: No    Drug use: No    Sexual activity: Yes     Partners: Male   Other Topics Concern    Not on file   Social History Narrative    ** Merged History Encounter **          Social Determinants of Health     Financial Resource Strain:     Difficulty of Paying Living Expenses: Not on file   Food Insecurity:     Worried About Running Out of Food in the Last Year: Not on file    Yandy of Food in the Last Year: Not on file   Transportation Needs:     Lack of Transportation (Medical): Not on file    Lack of Transportation (Non-Medical):  Not on file   Physical Activity:     Days of Exercise per Week: Not on file    Minutes of Exercise per Session: Not on file   Stress:     Feeling of Stress : Not on file   Social Connections:     Frequency of Communication with Friends and Family: Not on file    Frequency of Social Gatherings with Friends and Family: Not on file    Attends Yazidism Services: Not on file    Active Member of [address] [place] [address] or Organizations: Not on file    Attends Club or Organization Meetings: Not on file    Marital Status: Not on file   Intimate Partner Violence:     Fear of Current or Ex-Partner: Not on file    Emotionally Abused: Not on file    Physically Abused: Not on file    Sexually Abused: Not on file   Housing Stability:     Unable to Pay for Housing in the Last Year: Not on file    Number of [place] in the Last Year: Not on file    Unstable Housing in the Last Year: Not on file     Allergies   Allergen Reactions    Latex Swelling     RASH and THROAT SWELLING      Garlic Hives    Pcn [Penicillins] Swelling     Eyes swell shut      Penicillins Unknown (comments)    Sulfa (Sulfonamide Antibiotics) Swelling     Eyes swell shut      Sulfa (Sulfonamide Antibiotics) Unknown (comments)         Current Outpatient Medications   Medication Sig    cetirizine (ZYRTEC) 10 mg tablet     topiramate ER (Trokendi XR) 200 mg capsule Take 1 Capsule by mouth daily.  rimegepant (Nurtec ODT) 75 mg disintegrating tablet Take 1 Tablet by mouth every other day.  polyethylene glycol (Miralax) 17 gram/dose powder 17 g.  famotidine (PEPCID) 20 mg tablet famotidine 20 mg tablet    escitalopram oxalate (LEXAPRO) 20 mg tablet Take 1 Tablet by mouth daily.  mirtazapine (REMERON) 15 mg tablet Take 1 Tablet by mouth nightly.  SUMAtriptan (IMITREX) 100 mg tablet TAKE 1 TABLET BY MOUTH AS NEEDED FOR MIGRAINE FOR UP TO 1 DOSE    ondansetron (ZOFRAN ODT) 4 mg disintegrating tablet Take 1 Tab by mouth every eight (8) hours as needed for Nausea, Vomiting or Nausea or Vomiting.  aspirin delayed-release 81 mg tablet Take  by mouth daily.  furosemide (LASIX) 20 mg tablet Take  by mouth daily.  metoprolol tartrate (LOPRESSOR) 25 mg tablet Take  by mouth two (2) times a day.  atorvastatin (LIPITOR) 40 mg tablet Take  by mouth daily.     Eliquis 5 mg tablet  (Patient not taking: Reported on 11/8/2021)    olopatadine (PATADAY) 0.2 % drop ophthalmic solution  (Patient not taking: Reported on 11/8/2021)    rizatriptan (MAXALT-MLT) 10 mg disintegrating tablet rizatriptan 10 mg disintegrating tablet   TAKE 1 TABLET BY MOUTH ONCE AS NEEDED FOR MIGRAINE FOR UP TO 1 DOSE (Patient not taking: Reported on 11/8/2021)    naproxen (NAPROSYN) 500 mg tablet naproxen 500 mg tablet (Patient not taking: Reported on 6/23/2021)    lidocaine (LIDODERM) 5 % lidocaine 5 % topical patch   as needed (Patient not taking: Reported on 6/23/2021)  galcanezumab-gnlm (Emgality Pen) 120 mg/mL injection 1 mL by SubCUTAneous route every thirty (30) days. Indications: migraine prevention (Patient not taking: Reported on 11/8/2021)    promethazine (PHENERGAN) 50 mg suppository Insert 1 Suppository into rectum every eight (8) hours as needed for Nausea. (Patient not taking: Reported on 11/8/2021)    busPIRone (BUSPAR) 7.5 mg tablet  (Patient not taking: Reported on 6/23/2021)    butalbital-acetaminophen-caffeine (FIORICET, ESGIC) -40 mg per tablet butalbital-acetaminophen-caffeine 50 mg-325 mg-40 mg tablet (Patient not taking: Reported on 11/8/2021)    zolpidem (AMBIEN) 5 mg tablet TAKE 1 TABLET BY MOUTH ONCE DAILY ON EMPTY STOMACH AT LEAST 7 HOURS BEFORE PLANNED AWAKENING (Patient not taking: Reported on 11/8/2021)    methylPREDNISolone (MEDROL DOSEPACK) 4 mg tablet Per package directions (Patient not taking: Reported on 6/23/2021)    promethazine (PHENERGAN) 25 mg tablet Take 1 Tab by mouth every eight (8) hours as needed for Nausea. (Patient not taking: Reported on 6/23/2021)    predniSONE (DELTASONE) 20 mg tablet Take  by mouth two (2) times a day. (Patient not taking: Reported on 6/23/2021)    albuterol (PROVENTIL HFA, VENTOLIN HFA, PROAIR HFA) 90 mcg/actuation inhaler Take  by inhalation. (Patient not taking: Reported on 11/8/2021)    codeine-butalbital-acetaminophen-caffeine (FIORICET WITH CODEINE) -42-30 mg capsule Take  by mouth. (Patient not taking: Reported on 6/23/2021)    lisinopril (PRINIVIL, ZESTRIL) 10 mg tablet Take 20 mg by mouth daily.  Omeprazole delayed release (PRILOSEC D/R) 20 mg tablet Take 20 mg by mouth daily. (Patient not taking: Reported on 11/8/2021)     No current facility-administered medications for this visit. Neurologic Exam     Mental Status   WD/WN adult in NAD, normal grooming  VSS  A&O x 3    PERRL, nonicteric  Face is symmetric, tongue midline  Speech is  clear  No limb ataxia.  No abnl movements. Moving all extemities spontaneously and symmetric  Normal gait             Visit Vitals  BP (!) 144/88 (BP 1 Location: Right upper arm, BP Patient Position: Sitting)   Pulse 82   Ht 5' 5\" (1.651 m)   Wt 264 lb (119.7 kg)   LMP 11/15/2019   SpO2 97%   BMI 43.93 kg/m²       Assessment and Plan   Diagnoses and all orders for this visit:    1. Intractable chronic migraine without aura and without status migrainosus    Other orders  -     topiramate ER (Trokendi XR) 200 mg capsule; Take 1 Capsule by mouth daily. -     rimegepant (Nurtec ODT) 75 mg disintegrating tablet; Take 1 Tablet by mouth every other day. 68-year-old woman with chronic refractory migraine which I think is being highly influenced by her personal stressors. She is contemplating a job change which might be helpful. Unfortunately her insurance will lapse. At this juncture I do not think Emaglity is doing a great deal for her despite the stressors. Do not resume Emgality. I will reorder Nurtec for every other day dosing and in the meantime also proceed with qulipta at which time when she receives the medication she can stop Nurtec and use it only as needed. The new medication will be a daily preventative. She is already failed rizatriptan and sumatriptan. She understands the plan. Regarding the tenderness in the left occipital region, I recommend she see primary care to get that looked at particularly in the setting of the questionable bilateral otitis media. She might need antibiotics. Will defer to them. 30 minutes of time was spent reviewing the medical record today particularly all of the current medications, face-to-face time discussing worsening symptoms, completion of documentation. I reviewed and decided to continue the current medications. This clinical note was dictated with an electronic dictation software that can make unintentional errors.   If there are any questions, please contact me directly for clarification. Thank you for giving me the opportunity to assist in the care of Ms. Neetu Shanks. If you have questions, please do not hesitate to contact me.         Sincerely,      Sammie Ortega DO  Neurologist  Brain Injury Medicine  Diplomate ABPN

## 2021-11-08 NOTE — PROGRESS NOTES
Chief Complaint   Patient presents with    Follow-up     migraine, \" they have been so so. \"       HPI        Shantal is a 19-year-old woman following up. She has chronic migraine headaches. She is on a daily regimen of Emgality with  topiramate and Lexapro. Unfortunately there has been difficulty with coverage getting her medications. She has not had Emgality. She remains on topiramate extended release as well as Lexapro. We tried to get Nurtec and she only received a small supply. Nurtec was very beneficial for her. Since I saw her last she continues to have increasing personal stressors now where she is working in a job where she has a little help. She is contemplating leaving that job. As a result of that decision likely, she will lose coverage. She is having daily migrainous symptoms despite her treatment. She is also having some tenderness in the left occipital region. She thinks she has a double ear infection right now. Background:  Shantal is a 19-year-old woman who has a history of migraine headaches and hypertension. She has had migraine headaches ever since age 18 which in the past couple years has become a daily event described as either right or left-sided slightly more on the left pulsing throbbing pain with light and sound sensitivity and worse with movement. When she has severe escalation she has nausea and vomiting. The other reason why she is here is about 3 weeks ago she had sudden changes in her vision bilaterally described as depth changes possibly some hypersensitivity to light. She went to see ophthalmology whose notes I reviewed and she had normal optic discs but they were concerned about a left homonymous hemianopsia, but I do not have any VF tests to review. She tells me she had MRI brain done last week or so does not know the results. She still has a headache every day. No unusual numbness or weakness. Depression is a concern. She is on Lexapro for depression. She has EL but is not tolerating the NC. Review of Systems   Gastrointestinal: Positive for nausea and vomiting. Neurological: Positive for headaches. Psychiatric/Behavioral: Positive for depression. Negative for suicidal ideas. The patient has insomnia. All other systems reviewed and are negative. Past Medical History:   Diagnosis Date    Anxiety disorder     Depression     Diabetes ([address]     Headache     Hypertension      Family History   Problem Relation Age of Onset    Cancer Mother      Social History     Socioeconomic History    Marital status:      Spouse name: Not on file    Number of children: Not on file    Years of education: Not on file    Highest education level: Not on file   Occupational History    Not on file   Tobacco Use    Smoking status: Former Smoker     Packs/day: 0.50     Quit date: 3/8/2021     Years since quittin.6    Smokeless tobacco: Never Used   Vaping Use    Vaping Use: Never used   Substance and Sexual Activity    Alcohol use: No    Drug use: No    Sexual activity: Yes     Partners: Male   Other Topics Concern    Not on file   Social History Narrative    ** Merged History Encounter **          Social Determinants of Health     Financial Resource Strain:     Difficulty of Paying Living Expenses: Not on file   Food Insecurity:     Worried About [address] in the Last Year: Not on file    Yandy of Food in the Last Year: Not on file   Transportation Needs:     Lack of Transportation (Medical): Not on file    Lack of Transportation (Non-Medical):  Not on file   Physical Activity:     Days of Exercise per Week: Not on file    Minutes of Exercise per Session: Not on file   Stress:     Feeling of Stress : Not on file   Social Connections:     Frequency of Communication with Friends and Family: Not on file    Frequency of Social Gatherings with Friends and Family: Not on file    Attends Oriental orthodox Services: Not on file   [employer]s or Organizations: Not on file    Attends Club or Organization Meetings: Not on file    Marital Status: Not on file   Intimate Partner Violence:     Fear of Current or Ex-Partner: Not on file    Emotionally Abused: Not on file    Physically Abused: Not on file    Sexually Abused: Not on file   Housing Stability:     Unable to Pay for Housing in the Last Year: Not on file    Number of [place] in the Last Year: Not on file    Unstable Housing in the Last Year: Not on file     Allergies   Allergen Reactions    Latex Swelling     RASH and THROAT SWELLING      Garlic Hives    Pcn [Penicillins] Swelling     Eyes swell shut      Penicillins Unknown (comments)    Sulfa (Sulfonamide Antibiotics) Swelling     Eyes swell shut      Sulfa (Sulfonamide Antibiotics) Unknown (comments)         Current Outpatient Medications   Medication Sig    cetirizine (ZYRTEC) 10 mg tablet     topiramate ER (Trokendi XR) 200 mg capsule Take 1 Capsule by mouth daily.  rimegepant (Nurtec ODT) 75 mg disintegrating tablet Take 1 Tablet by mouth every other day.  polyethylene glycol (Miralax) 17 gram/dose powder 17 g.  famotidine (PEPCID) 20 mg tablet famotidine 20 mg tablet    escitalopram oxalate (LEXAPRO) 20 mg tablet Take 1 Tablet by mouth daily.  mirtazapine (REMERON) 15 mg tablet Take 1 Tablet by mouth nightly.  SUMAtriptan (IMITREX) 100 mg tablet TAKE 1 TABLET BY MOUTH AS NEEDED FOR MIGRAINE FOR UP TO 1 DOSE    ondansetron (ZOFRAN ODT) 4 mg disintegrating tablet Take 1 Tab by mouth every eight (8) hours as needed for Nausea, Vomiting or Nausea or Vomiting.  aspirin delayed-release 81 mg tablet Take  by mouth daily.  furosemide (LASIX) 20 mg tablet Take  by mouth daily.  metoprolol tartrate (LOPRESSOR) 25 mg tablet Take  by mouth two (2) times a day.  atorvastatin (LIPITOR) 40 mg tablet Take  by mouth daily.     Eliquis 5 mg tablet  (Patient not taking: Reported on 11/8/2021)    olopatadine (PATADAY) 0.2 % drop ophthalmic solution  (Patient not taking: Reported on 11/8/2021)    rizatriptan (MAXALT-MLT) 10 mg disintegrating tablet rizatriptan 10 mg disintegrating tablet   TAKE 1 TABLET BY MOUTH ONCE AS NEEDED FOR MIGRAINE FOR UP TO 1 DOSE (Patient not taking: Reported on 11/8/2021)    naproxen (NAPROSYN) 500 mg tablet naproxen 500 mg tablet (Patient not taking: Reported on 6/23/2021)    lidocaine (LIDODERM) 5 % lidocaine 5 % topical patch   as needed (Patient not taking: Reported on 6/23/2021)    galcanezumab-gnlm (Emgality Pen) 120 mg/mL injection 1 mL by SubCUTAneous route every thirty (30) days. Indications: migraine prevention (Patient not taking: Reported on 11/8/2021)    promethazine (PHENERGAN) 50 mg suppository Insert 1 Suppository into rectum every eight (8) hours as needed for Nausea. (Patient not taking: Reported on 11/8/2021)    busPIRone (BUSPAR) 7.5 mg tablet  (Patient not taking: Reported on 6/23/2021)    butalbital-acetaminophen-caffeine (FIORICET, ESGIC) -40 mg per tablet butalbital-acetaminophen-caffeine 50 mg-325 mg-40 mg tablet (Patient not taking: Reported on 11/8/2021)    zolpidem (AMBIEN) 5 mg tablet TAKE 1 TABLET BY MOUTH ONCE DAILY ON EMPTY STOMACH AT LEAST 7 HOURS BEFORE PLANNED AWAKENING (Patient not taking: Reported on 11/8/2021)    methylPREDNISolone (MEDROL DOSEPACK) 4 mg tablet Per package directions (Patient not taking: Reported on 6/23/2021)    promethazine (PHENERGAN) 25 mg tablet Take 1 Tab by mouth every eight (8) hours as needed for Nausea. (Patient not taking: Reported on 6/23/2021)    predniSONE (DELTASONE) 20 mg tablet Take  by mouth two (2) times a day. (Patient not taking: Reported on 6/23/2021)    albuterol (PROVENTIL HFA, VENTOLIN HFA, PROAIR HFA) 90 mcg/actuation inhaler Take  by inhalation.  (Patient not taking: Reported on 11/8/2021)    codeine-butalbital-acetaminophen-caffeine (FIORICET WITH CODEINE) -27-30 mg capsule Take  by mouth. (Patient not taking: Reported on 6/23/2021)    lisinopril (PRINIVIL, ZESTRIL) 10 mg tablet Take 20 mg by mouth daily.  Omeprazole delayed release (PRILOSEC D/R) 20 mg tablet Take 20 mg by mouth daily. (Patient not taking: Reported on 11/8/2021)     No current facility-administered medications for this visit. Neurologic Exam     Mental Status   WD/WN adult in NAD, normal grooming  VSS  A&O x 3    PERRL, nonicteric  Face is symmetric, tongue midline  Speech is  clear  No limb ataxia. No abnl movements. Moving all extemities spontaneously and symmetric  Normal gait             Visit Vitals  BP (!) 144/88 (BP 1 Location: Right upper arm, BP Patient Position: Sitting)   Pulse 82   Ht 5' 5\" (1.651 m)   Wt 264 lb (119.7 kg)   LMP 11/15/2019   SpO2 97%   BMI 43.93 kg/m²       Assessment and Plan   Diagnoses and all orders for this visit:    1. Intractable chronic migraine without aura and without status migrainosus    Other orders  -     topiramate ER (Trokendi XR) 200 mg capsule; Take 1 Capsule by mouth daily. -     rimegepant (Nurtec ODT) 75 mg disintegrating tablet; Take 1 Tablet by mouth every other day. 53-year-old woman with chronic refractory migraine which I think is being highly influenced by her personal stressors. She is contemplating a job change which might be helpful. Unfortunately her insurance will lapse. At this juncture I do not think Emaglity is doing a great deal for her despite the stressors. Do not resume Emgality. I will reorder Nurtec for every other day dosing and in the meantime also proceed with qulipta at which time when she receives the medication she can stop Nurtec and use it only as needed. The new medication will be a daily preventative. She is already failed rizatriptan and sumatriptan. She understands the plan.   Regarding the tenderness in the left occipital region, I recommend she see primary care to get that looked at particularly in the setting of the questionable bilateral otitis media. She might need antibiotics. Will defer to them. 30 minutes of time was spent reviewing the medical record today particularly all of the current medications, face-to-face time discussing worsening symptoms, completion of documentation. I reviewed and decided to continue the current medications. This clinical note was dictated with an electronic dictation software that can make unintentional errors. If there are any questions, please contact me directly for clarification.       2 [facility], [facility] [address]  Diplomate ABPN

## 2021-11-08 NOTE — PATIENT INSTRUCTIONS
Additional instructions:    Continue taking topiramate daily as prescribed. Stop taking Emgality. I will not renew it. I reordered Nurtec to be mailed to you to take every other day as a headache preventative. I also am ordering qulipta which is a new daily preventative medication and once you receive that then you can take Nurtec only as needed.

## 2021-11-08 NOTE — PROGRESS NOTES
Chief Complaint   Patient presents with    Follow-up     migraine, \" they have been so so. \"     Visit Vitals  BP (!) 144/88 (BP 1 Location: Right upper arm, BP Patient Position: Sitting)   Pulse 82   Ht 5' 5\" (1.651 m)   Wt 264 lb (119.7 kg)   SpO2 97%   BMI 43.93 kg/m²

## 2021-12-15 ENCOUNTER — TELEPHONE (OUTPATIENT)
Dept: NEUROLOGY | Age: 46
End: 2021-12-15

## 2021-12-15 NOTE — TELEPHONE ENCOUNTER
Pharmacy calling regarding p/t medication and needs a call back to confirm the medication she's currently on which is \"Trokendi\"

## 2021-12-16 NOTE — TELEPHONE ENCOUNTER
Luis Manuel Lange at [address] is calling back to get clarification on medication. Please call.

## 2021-12-20 ENCOUNTER — TELEPHONE (OUTPATIENT)
Dept: NEUROLOGY | Age: 46
End: 2021-12-20

## 2021-12-28 NOTE — TELEPHONE ENCOUNTER
Tereza, the pharmacist, would like to speak to Dr. Desmond Lira or her nurse. She has left numerous messages to speak to them.

## 2021-12-30 NOTE — TELEPHONE ENCOUNTER
Pharmacy called back, verified, pharmacist states that the patient has been receiving Qudexy 200mg ER \"since 01/2021, is it ok to keep dispensing that or switch to Trokendi\" Dr. Dorota Irene stated ok to stay on the [facility]

## 2022-03-19 PROBLEM — D68.59 THROMBOPHILIA (HCC): Status: ACTIVE | Noted: 2019-02-15

## 2022-03-19 PROBLEM — I15.9 SECONDARY HYPERTENSION: Status: ACTIVE | Noted: 2019-02-15

## 2022-03-20 PROBLEM — E66.01 OBESITY, MORBID (HCC): Status: ACTIVE | Noted: 2019-02-15

## 2022-04-21 ENCOUNTER — TELEPHONE (OUTPATIENT)
Dept: NEUROLOGY | Age: 47
End: 2022-04-21

## 2022-04-21 NOTE — TELEPHONE ENCOUNTER
Is she getting any Qulipta? If she has not been able to start or does not have any medication we can give her samples. If she is having unusual dizziness and weakness she really needs to go to the emergency room to be checked out and possibly might require some IV medications.

## 2022-04-21 NOTE — TELEPHONE ENCOUNTER
Patient was calling because shes had a migraine for over a week. she is having dizziness and is weak. She does not know what she can do.   Please call her at [phone]

## 2022-04-27 ENCOUNTER — HOSPITAL ENCOUNTER (EMERGENCY)
Age: 47
Discharge: HOME | End: 2022-04-27
Attending: EMERGENCY MEDICINE

## 2022-04-27 VITALS
DIASTOLIC BLOOD PRESSURE: 82 MMHG | WEIGHT: 250 LBS | HEIGHT: 65 IN | SYSTOLIC BLOOD PRESSURE: 120 MMHG | HEART RATE: 72 BPM | OXYGEN SATURATION: 96 % | BODY MASS INDEX: 41.65 KG/M2 | TEMPERATURE: 98.2 F | RESPIRATION RATE: 16 BRPM

## 2022-04-27 DIAGNOSIS — R51.9 RIGHT-SIDED HEADACHE: Primary | ICD-10-CM

## 2022-04-27 LAB
ANION GAP SERPL CALC-SCNC: 3 MMOL/L (ref 5–15)
BASOPHILS # BLD: 0 K/UL (ref 0–0.1)
BASOPHILS NFR BLD: 0 % (ref 0–1)
BUN SERPL-MCNC: 10 MG/DL (ref 6–20)
BUN/CREAT SERPL: 14 (ref 12–20)
CA-I BLD-MCNC: 9 MG/DL (ref 8.5–10.1)
CHLORIDE SERPL-SCNC: 110 MMOL/L (ref 97–108)
CO2 SERPL-SCNC: 26 MMOL/L (ref 21–32)
CREAT SERPL-MCNC: 0.69 MG/DL (ref 0.55–1.02)
DIFFERENTIAL METHOD BLD: NORMAL
EOSINOPHIL # BLD: 0.2 K/UL (ref 0–0.4)
EOSINOPHIL NFR BLD: 2 % (ref 0–7)
ERYTHROCYTE [DISTWIDTH] IN BLOOD BY AUTOMATED COUNT: 12.4 % (ref 11.5–14.5)
GLUCOSE SERPL-MCNC: 90 MG/DL (ref 65–100)
HCT VFR BLD AUTO: 38.5 % (ref 35–47)
HGB BLD-MCNC: 12.9 G/DL (ref 11.5–16)
IMM GRANULOCYTES # BLD AUTO: 0 K/UL (ref 0–0.04)
IMM GRANULOCYTES NFR BLD AUTO: 0 % (ref 0–0.5)
LYMPHOCYTES # BLD: 3.2 K/UL (ref 0.8–3.5)
LYMPHOCYTES NFR BLD: 34 % (ref 12–49)
MCH RBC QN AUTO: 30.9 PG (ref 26–34)
MCHC RBC AUTO-ENTMCNC: 33.5 G/DL (ref 30–36.5)
MCV RBC AUTO: 92.3 FL (ref 80–99)
MONOCYTES # BLD: 1 K/UL (ref 0–1)
MONOCYTES NFR BLD: 10 % (ref 5–13)
NEUTS SEG # BLD: 5.1 K/UL (ref 1.8–8)
NEUTS SEG NFR BLD: 54 % (ref 32–75)
NRBC # BLD: 0 K/UL (ref 0–0.01)
NRBC BLD-RTO: 0 PER 100 WBC
PLATELET # BLD AUTO: 201 K/UL (ref 150–400)
PMV BLD AUTO: 11.3 FL (ref 8.9–12.9)
POTASSIUM SERPL-SCNC: 4.3 MMOL/L (ref 3.5–5.1)
RBC # BLD AUTO: 4.17 M/UL (ref 3.8–5.2)
SODIUM SERPL-SCNC: 139 MMOL/L (ref 136–145)
WBC # BLD AUTO: 9.5 K/UL (ref 3.6–11)

## 2022-04-27 PROCEDURE — 99284 EMERGENCY DEPT VISIT MOD MDM: CPT

## 2022-04-27 PROCEDURE — 80048 BASIC METABOLIC PNL TOTAL CA: CPT

## 2022-04-27 PROCEDURE — 36415 COLL VENOUS BLD VENIPUNCTURE: CPT

## 2022-04-27 PROCEDURE — 96374 THER/PROPH/DIAG INJ IV PUSH: CPT

## 2022-04-27 PROCEDURE — 74011250636 HC RX REV CODE- 250/636: Performed by: EMERGENCY MEDICINE

## 2022-04-27 PROCEDURE — 85025 COMPLETE CBC W/AUTO DIFF WBC: CPT

## 2022-04-27 PROCEDURE — 96375 TX/PRO/DX INJ NEW DRUG ADDON: CPT

## 2022-04-27 RX ORDER — BUTALBITAL, ACETAMINOPHEN AND CAFFEINE 300; 40; 50 MG/1; MG/1; MG/1
1 CAPSULE ORAL
Qty: 30 CAPSULE | Refills: 0 | Status: SHIPPED | OUTPATIENT
Start: 2022-04-27 | End: 2022-05-02

## 2022-04-27 RX ORDER — DIPHENHYDRAMINE HYDROCHLORIDE 50 MG/ML
25 INJECTION, SOLUTION INTRAMUSCULAR; INTRAVENOUS
Status: COMPLETED | OUTPATIENT
Start: 2022-04-27 | End: 2022-04-27

## 2022-04-27 RX ORDER — KETOROLAC TROMETHAMINE 30 MG/ML
30 INJECTION, SOLUTION INTRAMUSCULAR; INTRAVENOUS
Status: COMPLETED | OUTPATIENT
Start: 2022-04-27 | End: 2022-04-27

## 2022-04-27 RX ORDER — ONDANSETRON 4 MG/1
4 TABLET, FILM COATED ORAL
Qty: 20 TABLET | Refills: 0 | Status: SHIPPED | OUTPATIENT
Start: 2022-04-27

## 2022-04-27 RX ORDER — KETOROLAC TROMETHAMINE 10 MG/1
10 TABLET, FILM COATED ORAL
Qty: 20 TABLET | Refills: 0 | Status: SHIPPED | OUTPATIENT
Start: 2022-04-27 | End: 2022-05-02

## 2022-04-27 RX ORDER — METOCLOPRAMIDE HYDROCHLORIDE 5 MG/ML
10 INJECTION INTRAMUSCULAR; INTRAVENOUS
Status: COMPLETED | OUTPATIENT
Start: 2022-04-27 | End: 2022-04-27

## 2022-04-27 RX ORDER — SUMATRIPTAN 100 MG/1
100 TABLET, FILM COATED ORAL
Qty: 9 TABLET | Refills: 0 | Status: SHIPPED | OUTPATIENT
Start: 2022-04-27 | End: 2022-04-27

## 2022-04-27 RX ADMIN — METOCLOPRAMIDE HYDROCHLORIDE 10 MG: 5 INJECTION INTRAMUSCULAR; INTRAVENOUS at 17:25

## 2022-04-27 RX ADMIN — METHYLPREDNISOLONE SODIUM SUCCINATE 125 MG: 125 INJECTION, POWDER, FOR SOLUTION INTRAMUSCULAR; INTRAVENOUS at 17:26

## 2022-04-27 RX ADMIN — SODIUM CHLORIDE 1000 ML: 9 INJECTION, SOLUTION INTRAVENOUS at 17:42

## 2022-04-27 RX ADMIN — KETOROLAC TROMETHAMINE 30 MG: 30 INJECTION, SOLUTION INTRAMUSCULAR; INTRAVENOUS at 17:26

## 2022-04-27 RX ADMIN — DIPHENHYDRAMINE HYDROCHLORIDE 25 MG: 50 INJECTION, SOLUTION INTRAMUSCULAR; INTRAVENOUS at 17:25

## 2022-04-27 NOTE — DISCHARGE INSTRUCTIONS
Thank you! Thank you for allowing me to care for you in the emergency department. I sincerely hope that you are satisfied with your visit today. It is my goal to provide you with excellent care. Below you will find a list of your labs and imaging from your visit today. Should you have any questions regarding these results please do not hesitate to call the emergency department. Labs -     Recent Results (from the past 12 hour(s))   CBC WITH AUTOMATED DIFF    Collection Time: 04/27/22  5:38 PM   Result Value Ref Range    WBC 9.5 3.6 - 11.0 K/uL    RBC 4.17 3.80 - 5.20 M/uL    HGB 12.9 11.5 - 16.0 g/dL    HCT 38.5 35.0 - 47.0 %    MCV 92.3 80.0 - 99.0 FL    MCH 30.9 26.0 - 34.0 PG    MCHC 33.5 30.0 - 36.5 g/dL    RDW 12.4 11.5 - 14.5 %    PLATELET 402 541 - 960 K/uL    MPV 11.3 8.9 - 12.9 FL    NRBC 0.0 0.0  WBC    ABSOLUTE NRBC 0.00 0.00 - 0.01 K/uL    NEUTROPHILS 54 32 - 75 %    LYMPHOCYTES 34 12 - 49 %    MONOCYTES 10 5 - 13 %    EOSINOPHILS 2 0 - 7 %    BASOPHILS 0 0 - 1 %    IMMATURE GRANULOCYTES 0 0 - 0.5 %    ABS. NEUTROPHILS 5.1 1.8 - 8.0 K/UL    ABS. LYMPHOCYTES 3.2 0.8 - 3.5 K/UL    ABS. MONOCYTES 1.0 0.0 - 1.0 K/UL    ABS. EOSINOPHILS 0.2 0.0 - 0.4 K/UL    ABS. BASOPHILS 0.0 0.0 - 0.1 K/UL    ABS. IMM.  GRANS. 0.0 0.00 - 0.04 K/UL    DF AUTOMATED     METABOLIC PANEL, BASIC    Collection Time: 04/27/22  5:38 PM   Result Value Ref Range    Sodium 139 136 - 145 mmol/L    Potassium 4.3 3.5 - 5.1 mmol/L    Chloride 110 (H) 97 - 108 mmol/L    CO2 26 21 - 32 mmol/L    Anion gap 3 (L) 5 - 15 mmol/L    Glucose 90 65 - 100 mg/dL    BUN 10 6 - 20 mg/dL    Creatinine 0.69 0.55 - 1.02 mg/dL    BUN/Creatinine ratio 14 12 - 20      GFR est AA >60 >60 ml/min/1.73m2    GFR est non-AA >60 >60 ml/min/1.73m2    Calcium 9.0 8.5 - 10.1 mg/dL       Radiologic Studies -   No orders to display     CT Results  (Last 48 hours)      None          CXR Results  (Last 48 hours)      None               If you feel that you have not received excellent quality care or timely care, please ask to speak to the nurse manager. Please choose us in the future for your continued health care needs. ------------------------------------------------------------------------------------------------------------  The exam and treatment you received in the Emergency Department were for an urgent problem and are not intended as complete care. It is important that you follow-up with a doctor, nurse practitioner, or physician assistant to:  (1) confirm your diagnosis,  (2) re-evaluation of changes in your illness and treatment, and  (3) for ongoing care. If your symptoms become worse or you do not improve as expected and you are unable to reach your usual health care provider, you should return to the Emergency Department. We are available 24 hours a day. Please take your discharge instructions with you when you go to your follow-up appointment. If you have any problem arranging a follow-up appointment, contact the Emergency Department immediately. If a prescription has been provided, please have it filled as soon as possible to prevent a delay in treatment. Read the entire medication instruction sheet provided to you by the pharmacy. If you have any questions or reservations about taking the medication due to side effects or interactions with other medications, please call your primary care physician or contact the ER to speak with the charge nurse. Make an appointment with your family doctor or the physician you were referred to for follow-up of this visit as instructed on your discharge paperwork, as this is a mandatory follow-up. Return to the ER if you are unable to be seen or if you are unable to be seen in a timely manner. If you have any problem arranging the follow-up visit, contact the Emergency Department immediately.

## 2022-04-27 NOTE — ED PROVIDER NOTES
EMERGENCY DEPARTMENT HISTORY AND PHYSICAL EXAM      Date: 4/27/2022  Patient Name: Eric Forbes    History of Presenting Illness     Chief Complaint   Patient presents with    Headache       History Provided By: Patient    HPI: Eric Forbes, 42 y.o. female with a past medical history significant HTN, HA disorder presents to the ED with cc of headache. Patient reports the symptoms have been going on for approximately 3 weeks, right side of her head, without radiation, without noted aggravating leaving factors. Patient ports nausea without vomiting, denies fevers or chills. Patient denies neck pain. Patient reports history of recurrent migraines which are frequent in nature, states this is similar to her usual    There are no other complaints, changes, or physical findings at this time. PCP: None    No current facility-administered medications on file prior to encounter. Current Outpatient Medications on File Prior to Encounter   Medication Sig Dispense Refill    cetirizine (ZYRTEC) 10 mg tablet       topiramate ER (Trokendi XR) 200 mg capsule Take 1 Capsule by mouth daily. 90 Capsule 3    rimegepant (Nurtec ODT) 75 mg disintegrating tablet Take 1 Tablet by mouth every other day. 18 Tablet 5    polyethylene glycol (Miralax) 17 gram/dose powder 17 g.  famotidine (PEPCID) 20 mg tablet famotidine 20 mg tablet      escitalopram oxalate (LEXAPRO) 20 mg tablet Take 1 Tablet by mouth daily. 90 Tablet 0    mirtazapine (REMERON) 15 mg tablet Take 1 Tablet by mouth nightly. 90 Tablet 1    SUMAtriptan (IMITREX) 100 mg tablet TAKE 1 TABLET BY MOUTH AS NEEDED FOR MIGRAINE FOR UP TO 1 DOSE 9 Tab 5    ondansetron (ZOFRAN ODT) 4 mg disintegrating tablet Take 1 Tab by mouth every eight (8) hours as needed for Nausea, Vomiting or Nausea or Vomiting. 30 Tab 0    aspirin delayed-release 81 mg tablet Take  by mouth daily.  furosemide (LASIX) 20 mg tablet Take  by mouth daily.       lisinopril (PRINIVIL, ZESTRIL) 10 mg tablet Take 20 mg by mouth daily.  metoprolol tartrate (LOPRESSOR) 25 mg tablet Take  by mouth two (2) times a day.  atorvastatin (LIPITOR) 40 mg tablet Take  by mouth daily. Past History     Past Medical History:  Past Medical History:   Diagnosis Date    Anxiety disorder     Depression     Diabetes ([address]     Headache     Hypertension        Past Surgical History:  Past Surgical History:   Procedure Laterality Date    HX BUNIONECTOMY Bilateral     HX CHOLECYSTECTOMY      HX GI      HX KNEE ARTHROSCOPY Left     HX ORTHOPAEDIC      HX TUBAL LIGATION         Family History:  Family History   Problem Relation Age of Onset    Cancer Mother        Social History:  Social History     Tobacco Use    Smoking status: Former Smoker     Packs/day: 0.50     Quit date: 3/8/2021     Years since quittin.1    Smokeless tobacco: Never Used   Vaping Use    Vaping Use: Never used   Substance Use Topics    Alcohol use: Yes     Comment: rarely     Drug use: No       Allergies: Allergies   Allergen Reactions    Latex Swelling     RASH and THROAT SWELLING      Garlic Hives    Pcn [Penicillins] Swelling     Eyes swell shut      Penicillins Unknown (comments)    Sulfa (Sulfonamide Antibiotics) Swelling     Eyes swell shut      Sulfa (Sulfonamide Antibiotics) Unknown (comments)         Review of Systems   Review of Systems   Constitutional: Negative for chills and fever. HENT: Negative for sinus pressure and sinus pain. Eyes: Negative for photophobia and redness. Respiratory: Negative for shortness of breath and wheezing. Cardiovascular: Negative for chest pain and palpitations. Gastrointestinal: Negative for abdominal pain and nausea. Genitourinary: Negative for flank pain and hematuria. Musculoskeletal: Negative for arthralgias and gait problem. Skin: Negative for color change and pallor. Neurological: Negative for dizziness and weakness.      Review of Systems    Physical Exam   Physical Exam  Constitutional:       General: Uncomfortable appearing     Appearance: Normal appearance. Not toxic-appearing. HENT:      Head: Normocephalic and atraumatic. Nose: Nose normal.      Mouth/Throat:      Mouth: Mucous membranes are moist.   Eyes:      Extraocular Movements: Extraocular movements intact. Pupils: Pupils are equal, round, and reactive to light. Cardiovascular:      Rate and Rhythm: Normal rate. Pulses: Normal pulses. Pulmonary:      Effort: Pulmonary effort is normal.      Breath sounds: No stridor. Abdominal:      General: Abdomen is flat. There is no distension. Musculoskeletal:         General: Normal range of motion. Cervical back: Normal range of motion and neck supple with no meningismus. Skin:     General: Skin is warm and dry. Capillary Refill: Capillary refill takes less than 2 seconds. Neurological:      General: No focal deficit present. Cranial nerves II through XII intact     Mental Status: Aert and oriented to person, place, and time. Psychiatric:         Mood and Affect: Mood normal.         Behavior: Behavior normal.       Physical Exam    Lab and Diagnostic Study Results     Labs -     Recent Results (from the past 12 hour(s))   CBC WITH AUTOMATED DIFF    Collection Time: 04/27/22  5:38 PM   Result Value Ref Range    WBC 9.5 3.6 - 11.0 K/uL    RBC 4.17 3.80 - 5.20 M/uL    HGB 12.9 11.5 - 16.0 g/dL    HCT 38.5 35.0 - 47.0 %    MCV 92.3 80.0 - 99.0 FL    MCH 30.9 26.0 - 34.0 PG    MCHC 33.5 30.0 - 36.5 g/dL    RDW 12.4 11.5 - 14.5 %    PLATELET 245 949 - 415 K/uL    MPV 11.3 8.9 - 12.9 FL    NRBC 0.0 0.0  WBC    ABSOLUTE NRBC 0.00 0.00 - 0.01 K/uL    NEUTROPHILS 54 32 - 75 %    LYMPHOCYTES 34 12 - 49 %    MONOCYTES 10 5 - 13 %    EOSINOPHILS 2 0 - 7 %    BASOPHILS 0 0 - 1 %    IMMATURE GRANULOCYTES 0 0 - 0.5 %    ABS. NEUTROPHILS 5.1 1.8 - 8.0 K/UL    ABS. LYMPHOCYTES 3.2 0.8 - 3.5 K/UL    ABS. MONOCYTES 1.0 0.0 - 1.0 K/UL    ABS. EOSINOPHILS 0.2 0.0 - 0.4 K/UL    ABS. BASOPHILS 0.0 0.0 - 0.1 K/UL    ABS. IMM. GRANS. 0.0 0.00 - 0.04 K/UL    DF AUTOMATED     METABOLIC PANEL, BASIC    Collection Time: 04/27/22  5:38 PM   Result Value Ref Range    Sodium 139 136 - 145 mmol/L    Potassium 4.3 3.5 - 5.1 mmol/L    Chloride 110 (H) 97 - 108 mmol/L    CO2 26 21 - 32 mmol/L    Anion gap 3 (L) 5 - 15 mmol/L    Glucose 90 65 - 100 mg/dL    BUN 10 6 - 20 mg/dL    Creatinine 0.69 0.55 - 1.02 mg/dL    BUN/Creatinine ratio 14 12 - 20      GFR est AA >60 >60 ml/min/1.73m2    GFR est non-AA >60 >60 ml/min/1.73m2    Calcium 9.0 8.5 - 10.1 mg/dL       Radiologic Studies -   @lastxrresult@  CT Results  (Last 48 hours)    None        CXR Results  (Last 48 hours)    None            Medical Decision Making   - I am the first provider for this patient. - I reviewed the vital signs, available nursing notes, past medical history, past surgical history, family history and social history. - Initial assessment performed. The patients presenting problems have been discussed, and they are in agreement with the care plan formulated and outlined with them. I have encouraged them to ask questions as they arise throughout their visit. Vital Signs-Reviewed the patient's vital signs. Patient Vitals for the past 12 hrs:   Temp Pulse Resp BP SpO2   04/27/22 1920 -- 73 16 116/79 97 %   04/27/22 1845 -- 72 17 130/76 97 %   04/27/22 1643 98.2 °F (36.8 °C) 85 16 (!) 170/89 95 %         ED Course:     ED Course as of 04/27/22 1927 Wed Apr 27, 2022 1924 Patient reports feeling better at this time, states she is able to treat her headache at home with it at its current level. Understands need for close follow-up as well as return precaution [CS]      ED Course User Index  [CS] Ravindra Maldonado MD         Disposition   Disposition: DC- Adult Discharges: All of the diagnostic tests were reviewed and questions answered.  Diagnosis, care plan and treatment options were discussed. The patient understands the instructions and will follow up as directed. The patients results have been reviewed with them. They have been counseled regarding their diagnosis. The patient verbally convey understanding and agreement of the signs, symptoms, diagnosis, treatment and prognosis and additionally agrees to follow up as recommended with their PCP in 24 - 48 hours. They also agree with the care-plan and convey that all of their questions have been answered. I have also put together some discharge instructions for them that include: 1) educational information regarding their diagnosis, 2) how to care for their diagnosis at home, as well a 3) list of reasons why they would want to return to the ED prior to their follow-up appointment, should their condition change. DISCHARGE PLAN:  1. Current Discharge Medication List      CONTINUE these medications which have NOT CHANGED    Details   cetirizine (ZYRTEC) 10 mg tablet       topiramate ER (Trokendi XR) 200 mg capsule Take 1 Capsule by mouth daily. Qty: 90 Capsule, Refills: 3      rimegepant (Nurtec ODT) 75 mg disintegrating tablet Take 1 Tablet by mouth every other day. Qty: 18 Tablet, Refills: 5      polyethylene glycol (Miralax) 17 gram/dose powder 17 g.      famotidine (PEPCID) 20 mg tablet famotidine 20 mg tablet      escitalopram oxalate (LEXAPRO) 20 mg tablet Take 1 Tablet by mouth daily. Qty: 90 Tablet, Refills: 0      mirtazapine (REMERON) 15 mg tablet Take 1 Tablet by mouth nightly. Qty: 90 Tablet, Refills: 1      SUMAtriptan (IMITREX) 100 mg tablet TAKE 1 TABLET BY MOUTH AS NEEDED FOR MIGRAINE FOR UP TO 1 DOSE  Qty: 9 Tab, Refills: 5      ondansetron (ZOFRAN ODT) 4 mg disintegrating tablet Take 1 Tab by mouth every eight (8) hours as needed for Nausea, Vomiting or Nausea or Vomiting. Qty: 30 Tab, Refills: 0      aspirin delayed-release 81 mg tablet Take  by mouth daily.       furosemide (LASIX) 20 mg tablet Take  by mouth daily. lisinopril (PRINIVIL, ZESTRIL) 10 mg tablet Take 20 mg by mouth daily. metoprolol tartrate (LOPRESSOR) 25 mg tablet Take  by mouth two (2) times a day. atorvastatin (LIPITOR) 40 mg tablet Take  by mouth daily. 2.   Follow-up Information    None       3. Return to ED if worse   4. Current Discharge Medication List      START taking these medications    Details   ketorolac (TORADOL) 10 mg tablet Take 1 Tablet by mouth every six (6) hours as needed for Pain for up to 5 days. Qty: 20 Tablet, Refills: 0  Start date: 4/27/2022, End date: 5/2/2022      butalbital-acetaminophen-caff (Fioricet) -40 mg per capsule Take 1 Capsule by mouth every four (4) hours as needed for Headache for up to 5 days. Qty: 30 Capsule, Refills: 0  Start date: 4/27/2022, End date: 5/2/2022      ondansetron hcl (Zofran) 4 mg tablet Take 1 Tablet by mouth every eight (8) hours as needed for Nausea. Qty: 20 Tablet, Refills: 0  Start date: 4/27/2022      !! SUMAtriptan (Imitrex) 100 mg tablet Take 1 Tablet by mouth once as needed for Migraine for up to 1 dose. Qty: 9 Tablet, Refills: 0  Start date: 4/27/2022, End date: 4/27/2022       !! - Potential duplicate medications found. Please discuss with provider. CONTINUE these medications which have NOT CHANGED    Details   !! SUMAtriptan (IMITREX) 100 mg tablet TAKE 1 TABLET BY MOUTH AS NEEDED FOR MIGRAINE FOR UP TO 1 DOSE  Qty: 9 Tab, Refills: 5       !! - Potential duplicate medications found. Please discuss with provider. Diagnosis     Clinical Impression:   1. Right-sided headache        Attestations:    Shawn Barros MD    Please note that this dictation was completed with [employer] the [employer] voice recognition software. Quite often unanticipated grammatical, syntax, homophones, and other interpretive errors are inadvertently transcribed by the computer software. Please disregard these errors. Please excuse any errors that have escaped final proofreading. Thank you.

## 2022-04-27 NOTE — TELEPHONE ENCOUNTER
Patient called back verified, states \" I never ended up getting the Asuncion Ortiz, and I live over an hour away, but also this headache has been continuous and is giving me nausea and dizziness also. \" I advised that the best option then would be to go to the ER to receive IV medication, the patient agreed.

## 2022-04-27 NOTE — ED TRIAGE NOTES
[facility] 15 pt stated that she has had a migraine for about a month; stated that her neurologist called her and told her to come for further evaluation

## 2022-04-27 NOTE — Clinical Note
[facility] EMERGENCY DEPT  [address] [place] [id number]  [phone]    Work/School Note    Date: 4/27/2022    To Whom It May concern:    Kyle Sarabia was seen and treated today in the emergency room by the following provider(s):  Attending Provider: Grazyna Molina MD.      Kyle Sarabia is excused from work/school on 4/27/2022 through 4/29/2022. She is medically clear to return to work/school on 4/30/2022.          Sincerely,          Romeo Orr MD

## 2022-07-25 RX ORDER — SUMATRIPTAN 100 MG/1
TABLET, FILM COATED ORAL
Qty: 9 TABLET | Refills: 5 | Status: SHIPPED | OUTPATIENT
Start: 2022-07-25

## 2022-07-25 RX ORDER — ESCITALOPRAM OXALATE 20 MG/1
20 TABLET ORAL DAILY
Qty: 90 TABLET | Refills: 0 | Status: SHIPPED | OUTPATIENT
Start: 2022-07-25

## 2022-07-25 RX ORDER — ONDANSETRON 4 MG/1
4 TABLET, ORALLY DISINTEGRATING ORAL
Qty: 30 TABLET | Refills: 0 | Status: SHIPPED | OUTPATIENT
Start: 2022-07-25

## 2022-12-20 ENCOUNTER — DOCUMENTATION ONLY (OUTPATIENT)
Dept: NEUROLOGY | Age: 47
End: 2022-12-20

## 2022-12-20 ENCOUNTER — OFFICE VISIT (OUTPATIENT)
Dept: NEUROLOGY | Age: 47
End: 2022-12-20
Payer: COMMERCIAL

## 2022-12-20 VITALS
WEIGHT: 230 LBS | RESPIRATION RATE: 16 BRPM | OXYGEN SATURATION: 99 % | SYSTOLIC BLOOD PRESSURE: 130 MMHG | HEIGHT: 65 IN | DIASTOLIC BLOOD PRESSURE: 80 MMHG | BODY MASS INDEX: 38.32 KG/M2 | HEART RATE: 82 BPM

## 2022-12-20 DIAGNOSIS — G47.00 INSOMNIA, UNSPECIFIED TYPE: ICD-10-CM

## 2022-12-20 DIAGNOSIS — G43.719 INTRACTABLE CHRONIC MIGRAINE WITHOUT AURA AND WITHOUT STATUS MIGRAINOSUS: Primary | ICD-10-CM

## 2022-12-20 PROCEDURE — 99214 OFFICE O/P EST MOD 30 MIN: CPT | Performed by: PSYCHIATRY & NEUROLOGY

## 2022-12-20 RX ORDER — OMEPRAZOLE 20 MG/1
1 CAPSULE, DELAYED RELEASE ORAL AT BEDTIME
COMMUNITY

## 2022-12-20 RX ORDER — MIRTAZAPINE 15 MG/1
15 TABLET, FILM COATED ORAL
Qty: 30 TABLET | Refills: 5 | Status: SHIPPED | OUTPATIENT
Start: 2022-12-20

## 2022-12-20 RX ORDER — ONDANSETRON 4 MG/1
4 TABLET, FILM COATED ORAL
Qty: 20 TABLET | Refills: 0 | Status: SHIPPED | OUTPATIENT
Start: 2022-12-20

## 2022-12-20 RX ORDER — LISINOPRIL AND HYDROCHLOROTHIAZIDE 12.5; 2 MG/1; MG/1
1 TABLET ORAL DAILY
COMMUNITY
Start: 2022-11-11 | End: 2023-11-11

## 2022-12-20 RX ORDER — RIMEGEPANT SULFATE 75 MG/75MG
75 TABLET, ORALLY DISINTEGRATING ORAL EVERY OTHER DAY
Qty: 16 TABLET | Refills: 5 | Status: SHIPPED | OUTPATIENT
Start: 2022-12-20

## 2022-12-20 RX ORDER — ERENUMAB-AOOE 70 MG/ML
70 INJECTION SUBCUTANEOUS
Qty: 3 EACH | Refills: 3 | Status: SHIPPED | OUTPATIENT
Start: 2022-12-20

## 2022-12-20 NOTE — PROGRESS NOTES
Patient was given 1 subq injection of Aimovig 70mg/mL in the right arm. Patient tolerated medication well. No adverse reactions. Patient was given medication instructions. Patient verbalizes understanding.      LOT: [id number]  EXP: 05/2023  MANU: Kathline Lipoma

## 2022-12-20 NOTE — PROGRESS NOTES
Chief Complaint   Patient presents with    Follow-up     Migraine: Patient reports having headaches everyday      Visit Vitals  /80 (BP 1 Location: Left upper arm, BP Patient Position: Sitting)   Pulse 82   Resp 16   Ht 5' 5\" (1.651 m)   Wt 230 lb (104.3 kg)   SpO2 99%   BMI 38.27 kg/m²

## 2022-12-20 NOTE — PROGRESS NOTES
Chief Complaint   Patient presents with    Follow-up     Migraine: Patient reports having headaches everyday        HPI        Shantal is a 47-year-old woman following up. She has chronic migraine headaches. Since I saw her last there have been various changes. She has a new job now with better insurance coverage. She has been off of her medications for migraine management with the exception of some sumatriptan. She is having essentially daily migrainous symptoms. She works at night now. Having worsening insomnia. Drives from [facility]. Has some vertigo with her migraines sometimes. Previously on Emgality which failed to give improvement. Migraine medication history: Emaglity, sumatriptan, escitalopram, topiramate, rizatriptan, Nurtec    Background:  Shantal is a 47-year-old woman who has a history of migraine headaches and hypertension. She has had migraine headaches ever since age 18 which in the past couple years has become a daily event described as either right or left-sided slightly more on the left pulsing throbbing pain with light and sound sensitivity and worse with movement. When she has severe escalation she has nausea and vomiting. The other reason why she is here is about 3 weeks ago she had sudden changes in her vision bilaterally described as depth changes possibly some hypersensitivity to light. She went to see ophthalmology whose notes I reviewed and she had normal optic discs but they were concerned about a left homonymous hemianopsia, but I do not have any VF tests to review. She tells me she had MRI brain done last week or so does not know the results. She still has a headache every day. No unusual numbness or weakness. Depression is a concern. She is on Lexapro for depression. She has EL but is not tolerating the NC. Review of Systems   Gastrointestinal:  Positive for nausea and vomiting. Neurological:  Positive for headaches.    Psychiatric/Behavioral:  Positive for depression. Negative for suicidal ideas. The patient has insomnia. All other systems reviewed and are negative. Past Medical History:   Diagnosis Date    Anxiety disorder     Depression     Diabetes ([address]     Headache     Hypertension      Family History   Problem Relation Age of Onset    Cancer Mother      Social History     Socioeconomic History    Marital status:      Spouse name: Not on file    Number of children: Not on file    Years of education: Not on file    Highest education level: Not on file   Occupational History    Not on file   Tobacco Use    Smoking status: Former     Packs/day: 0.50     Types: Cigarettes     Quit date: 3/8/2021     Years since quittin.7    Smokeless tobacco: Never   Vaping Use    Vaping Use: Never used   Substance and Sexual Activity    Alcohol use: Yes     Comment: rarely     Drug use: No    Sexual activity: Not Currently     Partners: Male   Other Topics Concern    Not on file   Social History Narrative    ** Merged History Encounter **          Social Determinants of Health     Financial Resource Strain: Not on file   Food Insecurity: Not on file   Transportation Needs: Not on file   Physical Activity: Not on file   Stress: Not on file   Social Connections: Not on file   Intimate Partner Violence: Not on file   Housing Stability: Not on file     Allergies   Allergen Reactions    Latex Swelling, Anaphylaxis and Hives     RASH and THROAT SWELLING      Garlic Hives, Cough and Other (comments)     Reaction Type: Allergy; Reaction(s): coughing, rash    Sulfa (Sulfonamide Antibiotics) Swelling     Eyes swell shut      Pcn [Penicillins] Swelling     Eyes swell shut      Penicillins Unknown (comments)    Sulfa (Sulfonamide Antibiotics) Unknown (comments)         Current Outpatient Medications   Medication Sig    lisinopril-hydroCHLOROthiazide (PRINZIDE, ZESTORETIC) 20-12.5 mg per tablet Take 1 Tablet by mouth daily.     omeprazole (PRILOSEC) 20 mg capsule 1 Capsule At bedtime. erenumab-aooe (Aimovig Autoinjector) 70 mg/mL injection 1 mL by SubCUTAneous route every thirty (30) days. rimegepant (Nurtec ODT) 75 mg disintegrating tablet Take 1 Tablet by mouth every other day. mirtazapine (REMERON) 15 mg tablet Take 1 Tablet by mouth nightly. ondansetron hcl (Zofran) 4 mg tablet Take 1 Tablet by mouth every eight (8) hours as needed for Nausea. SUMAtriptan (IMITREX) 100 mg tablet TAKE 1 TABLET BY MOUTH AS NEEDED FOR MIGRAINE FOR UP TO 1 DOSE    escitalopram oxalate (LEXAPRO) 20 mg tablet Take 1 Tablet by mouth in the morning. famotidine (PEPCID) 20 mg tablet famotidine 20 mg tablet    aspirin delayed-release 81 mg tablet Take  by mouth daily. metoprolol tartrate (LOPRESSOR) 25 mg tablet Take  by mouth two (2) times a day. ondansetron (ZOFRAN ODT) 4 mg disintegrating tablet Take 1 Tablet by mouth every eight (8) hours as needed for Nausea, Vomiting or Nausea or Vomiting. (Patient not taking: Reported on 12/20/2022)    cetirizine (ZYRTEC) 10 mg tablet  (Patient not taking: Reported on 12/20/2022)    topiramate ER (Trokendi XR) 200 mg capsule Take 1 Capsule by mouth daily. (Patient not taking: Reported on 12/20/2022)    polyethylene glycol (MIRALAX) 17 gram/dose powder 17 g. (Patient not taking: Reported on 12/20/2022)    mirtazapine (REMERON) 15 mg tablet Take 1 Tablet by mouth nightly. (Patient not taking: Reported on 12/20/2022)    furosemide (LASIX) 20 mg tablet Take  by mouth daily. (Patient not taking: Reported on 12/20/2022)    lisinopril (PRINIVIL, ZESTRIL) 10 mg tablet Take 20 mg by mouth daily. (Patient not taking: Reported on 12/20/2022)    atorvastatin (LIPITOR) 40 mg tablet Take  by mouth daily. (Patient not taking: Reported on 12/20/2022)     No current facility-administered medications for this visit.            Neurologic Exam     Mental Status   WD/WN adult in NAD, normal grooming  VSS  A&O x 3    PERRL, nonicteric  Face is symmetric, tongue midline  Speech is  clear  No limb ataxia. No abnl movements. Moving all extemities spontaneously and symmetric  Normal gait           Visit Vitals  /80 (BP 1 Location: Left upper arm, BP Patient Position: Sitting)   Pulse 82   Resp 16   Ht 5' 5\" (1.651 m)   Wt 230 lb (104.3 kg)   LMP 11/15/2019   SpO2 99%   BMI 38.27 kg/m²       Assessment and Plan   Diagnoses and all orders for this visit:    1. Intractable chronic migraine without aura and without status migrainosus  -     erenumab-aooe (Aimovig Autoinjector) 70 mg/mL injection; 1 mL by SubCUTAneous route every thirty (30) days. -     rimegepant (Nurtec ODT) 75 mg disintegrating tablet; Take 1 Tablet by mouth every other day. -     mirtazapine (REMERON) 15 mg tablet; Take 1 Tablet by mouth nightly. -     ondansetron hcl (Zofran) 4 mg tablet; Take 1 Tablet by mouth every eight (8) hours as needed for Nausea. -     THER/PROPH/DIAG INJECTION, SUBCUT/IM    2. Insomnia, unspecified type  -     mirtazapine (REMERON) 15 mg tablet; Take 1 Tablet by mouth nightly. 51-year-old woman with chronic migraine headache currently untreated. She has new insurance coverage so we can try better options. I am going to start with Aimovig first dose now. She does not have any history of constipation rather she has the opposite. I discussed the side effects with her. Continue to use Nurtec as needed which I will send to a mail order pharmacy. 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 She works at night. I am going to give her some Remeron to use. She tried trazodone before which she tells me failed. I will have her follow-up with MARIA C Cornejo in a virtual visit in about 6 months. She understands the plan. I reviewed and decided to continue the current medications. This clinical note was dictated with an electronic dictation software that can make unintentional errors.   If there are any questions, please contact me directly for clarification.       [address] [place] [address], [facility] [place] [address]  Diplomate ABPN

## 2023-02-04 ENCOUNTER — TELEPHONE (OUTPATIENT)
Dept: NEUROLOGY | Age: 48
End: 2023-02-04

## 2023-02-05 NOTE — TELEPHONE ENCOUNTER
Re: Jurgen FUNK request in [facility], Key# [id number], submitted and awaiting update. Pt might not be able to take Aimovig and nurtec prevention. Sent with notes.

## 2023-02-15 NOTE — TELEPHONE ENCOUNTER
Re: Angel FUNK Denied. Scanned letter to chart. Per denial letter pt cannot take Aimovig and Prevention Nurtec at the same time. Sent update to nurse.

## 2023-02-17 NOTE — TELEPHONE ENCOUNTER
Called patient. No answer. Left a VM stating that I received a message from our PA stating, \"PA request for [facility] [address] was denied. Per plan pt is not able to take Aimovig and prevention Nurtec. Pt has been getting Nurtec through [facility]. \" Informed her that she doesn't see the NP until 06/20/23. Asked if she is still taking nurtec or if she ever started the aimovig. Asked to call back.

## 2023-02-27 ENCOUNTER — TELEPHONE (OUTPATIENT)
Dept: NEUROLOGY | Age: 48
End: 2023-02-27

## 2023-02-27 NOTE — TELEPHONE ENCOUNTER
Re: Yessi FUNK request from pharmacy through [facility], Archived Key. Nurse called pt and left v/m for call back but no update as of yet. Will create new case if needed after pt calls back.

## 2023-03-25 ENCOUNTER — TELEPHONE (OUTPATIENT)
Dept: NEUROLOGY | Age: 48
End: 2023-03-25

## 2023-03-25 NOTE — TELEPHONE ENCOUNTER
Re: Sharon Schilling another PA request from pharmacy for PA, pt has not called back to discuss meds with nurse. Closed key and sent message to nurse. Need to confirm if pt is also taking Nurtec prevention or not bc she cant take both per her plan.

## 2023-03-25 NOTE — TELEPHONE ENCOUNTER
Rcvd fax from [facility] requesting refill for Zofran. Sent update to nurse since provider no loner with practice.

## 2023-03-27 ENCOUNTER — TELEPHONE (OUTPATIENT)
Dept: NEUROLOGY | Age: 48
End: 2023-03-27

## 2023-03-27 NOTE — TELEPHONE ENCOUNTER
Call placed to patient. 2 identifiers received. Patient stated she has not had the nurtec or aimovig due to insurance issues. When she did have nurtec they only sent 9 pills.

## 2023-03-28 NOTE — TELEPHONE ENCOUNTER
Re: Maryann Berger new PA request in [facility], Key# [id number], submitted and PA now approved. Effective 03/28/23-06/26/23 auth# [id number]    Re: Nurtec    Submitted new PA request for prevention in [facility] Key# [id number], PA will most likely deny as pt cannot take 2 CGRPs, Pt is already approved for Aimovig 70mg, script has been sent to [facility] but is no longer on [facility] portal.     Pt will need a new script sent to [facility] from [facility]. Sent update to nurse.

## 2023-06-20 ENCOUNTER — TELEMEDICINE (OUTPATIENT)
Age: 48
End: 2023-06-20
Payer: COMMERCIAL

## 2023-06-20 DIAGNOSIS — T39.95XA ANALGESIC REBOUND HEADACHE: ICD-10-CM

## 2023-06-20 DIAGNOSIS — G44.40 ANALGESIC REBOUND HEADACHE: ICD-10-CM

## 2023-06-20 DIAGNOSIS — G47.26 SHIFTING SLEEP-WORK SCHEDULE, AFFECTING SLEEP: ICD-10-CM

## 2023-06-20 DIAGNOSIS — G43.719 CHRONIC MIGRAINE WITHOUT AURA, INTRACTABLE, WITHOUT STATUS MIGRAINOSUS: Primary | ICD-10-CM

## 2023-06-20 PROCEDURE — 99214 OFFICE O/P EST MOD 30 MIN: CPT | Performed by: NURSE PRACTITIONER

## 2023-06-20 RX ORDER — ERENUMAB-AOOE 70 MG/ML
70 INJECTION SUBCUTANEOUS
Qty: 1 ML | Refills: 5 | Status: SHIPPED | OUTPATIENT
Start: 2023-06-20

## 2023-06-20 RX ORDER — ZALEPLON 10 MG/1
10 CAPSULE ORAL NIGHTLY
Qty: 30 CAPSULE | Refills: 2 | Status: SHIPPED | OUTPATIENT
Start: 2023-06-20 | End: 2023-09-18

## 2023-06-20 NOTE — PROGRESS NOTES
defined, nourished, and groomed individual in no acute distress. Psych:  Good mood and bright affect    NEUROLOGICAL EXAMINATION:     Mental Status:   Alert and oriented to person, place, and time with recent and remote memory intact. Attention span and concentration are normal. Speech is fluent with a full fund of knowledge. Cranial Nerves:  I: smell Not tested   II: visual fields Not assessed   II: pupils Equal, round, reactive to light   II: optic disc Not assessed   III,VII: ptosis none   III,IV,VI: extraocular muscles  Full ROM   V: mastication normal   V: facial light touch sensation  Not assessed   VII: facial muscle function   symmetric   VIII: hearing symmetric   IX: soft palate elevation  normal   XI: trapezius strength  Not assessed   XI: sternocleidomastoid strength Not assessed   XI: neck flexion strength  Not assessed   XII: tongue  midline     Motor Examination: Normal tone and bulk. Strength was not assessed      Sensory exam:  Not assessed     Coordination:  No resting or intention tremor    Gait and Station:  Steady while walking. No muscle wasting or fasiculations noted. Reflexes:  Not assessed    Assessment & Plan:      Diagnosis Orders   1. Chronic migraine without aura, intractable, without status migrainosus  Erenumab-aooe (AIMOVIG) 70 MG/ML SOAJ      2. Shifting sleep-work schedule, affecting sleep  zaleplon (SONATA) 10 MG capsule      3. Analgesic rebound headache          Chronic daily headache, likely secondary to analgesic rebound, with underlying migraine. We discussed analgesic induced HA and the use of analgesics more than 2-3 times weekly will do nothing but drive the HA and to break this cycle all analgesics, whether OTC or prescribed have to be discontinued and HA likely to get worse before they get better. She is presently on prednisone due to a hand injury and has stopped the Ibuprofen for now.  Instructed to only treat the migraine with the Imitrex no more than

## 2023-08-15 ENCOUNTER — TELEMEDICINE (OUTPATIENT)
Age: 48
End: 2023-08-15
Payer: COMMERCIAL

## 2023-08-15 DIAGNOSIS — G47.26 SHIFTING SLEEP-WORK SCHEDULE, AFFECTING SLEEP: ICD-10-CM

## 2023-08-15 DIAGNOSIS — G43.719 CHRONIC MIGRAINE WITHOUT AURA, INTRACTABLE, WITHOUT STATUS MIGRAINOSUS: Primary | ICD-10-CM

## 2023-08-15 PROCEDURE — 99214 OFFICE O/P EST MOD 30 MIN: CPT | Performed by: NURSE PRACTITIONER

## 2023-08-15 RX ORDER — ERENUMAB-AOOE 140 MG/ML
140 INJECTION, SOLUTION SUBCUTANEOUS
Qty: 1 ML | Refills: 5 | Status: SHIPPED | OUTPATIENT
Start: 2023-08-15

## 2023-08-15 RX ORDER — ZALEPLON 10 MG/1
20 CAPSULE ORAL NIGHTLY PRN
Qty: 60 CAPSULE | Refills: 2 | Status: SHIPPED | OUTPATIENT
Start: 2023-08-15 | End: 2023-11-13

## 2023-08-15 NOTE — PROGRESS NOTES
[address] [facility]  [address] [address] [place] [phone] Office   [phone] Fax           Date:  08/15/23     Name:  Narendra Cisneros  :  1975  MRN:  [id number]     PCP:  None None    Maddy Flannery is a 57 y.o. female who was seen by synchronous (real-time) audio-video technology on 8/15/2023 for Migraine    Subjective:   Since her last visit, she was able to start the [address] She has had two doses of this. She is still having  a few migraines per week. A lot of this is related to some of some of the triggers she is exposed to while at work. This is related to the blue lights on the trucks that shine at her as they come and go throughout the day. She still has tried the  which only helps a little. It seems to help her sleep a little longer and does not feel like she is as drowsy when she wakes up. At her last visit, she was to start [address] but did not have a prior authorization until , and was never told. This PA is only good through 2023. She continues to have some form of headache daily and will have severe headache 2-3 days per week. When the migraine is severe, she will take Imitrex twice a week. She will also try ibuprofen and Tylenol. She is taking something for the headache daily. She does have some aura of black dots prior to worsening of headache. There may also be some prodromal symptoms of yawning excessively. She notes that this is likely worse due to shift work sleep disorder. Works at night and has difficulty sleeping during the day. Remeron and the Trazodone were not beneficial. She has been on prednisone for two days due to a hand injury and is not taking the Ibuprofen. She will be on this for seven days total.     Recap from LV:  Chronic daily headache, likely secondary to analgesic rebound, with underlying migraine.  We discussed analgesic induced

## 2023-09-18 ENCOUNTER — TELEPHONE (OUTPATIENT)
Age: 48
End: 2023-09-18

## 2023-09-18 NOTE — TELEPHONE ENCOUNTER
Re: Tammy COLLAZO continuation request in [facility] key# [id number], submitted and med is available without auth. archived key.

## 2023-09-19 ENCOUNTER — TELEPHONE (OUTPATIENT)
Age: 48
End: 2023-09-19

## 2023-09-22 NOTE — TELEPHONE ENCOUNTER
RE: Zaleplon  Key: [id number] fax that medication has been approved.     Letter to be scanned in    Nurse notified

## 2023-11-14 ENCOUNTER — TELEMEDICINE (OUTPATIENT)
Age: 48
End: 2023-11-14
Payer: COMMERCIAL

## 2023-11-14 DIAGNOSIS — G43.719 CHRONIC MIGRAINE WITHOUT AURA, INTRACTABLE, WITHOUT STATUS MIGRAINOSUS: ICD-10-CM

## 2023-11-14 DIAGNOSIS — G47.26 SHIFTING SLEEP-WORK SCHEDULE, AFFECTING SLEEP: ICD-10-CM

## 2023-11-14 PROCEDURE — 99214 OFFICE O/P EST MOD 30 MIN: CPT | Performed by: NURSE PRACTITIONER

## 2023-11-14 RX ORDER — RIMEGEPANT SULFATE 75 MG/75MG
75 TABLET, ORALLY DISINTEGRATING ORAL EVERY OTHER DAY
Qty: 8 TABLET | Refills: 5 | Status: SHIPPED | OUTPATIENT
Start: 2023-11-14

## 2023-11-14 RX ORDER — ZALEPLON 10 MG/1
20 CAPSULE ORAL NIGHTLY PRN
Qty: 60 CAPSULE | Refills: 2 | Status: SHIPPED | OUTPATIENT
Start: 2023-11-14 | End: 2024-02-12

## 2023-11-14 RX ORDER — ERENUMAB-AOOE 140 MG/ML
140 INJECTION, SOLUTION SUBCUTANEOUS
Qty: 1 ML | Refills: 5 | Status: SHIPPED | OUTPATIENT
Start: 2023-11-14

## 2023-11-14 NOTE — PROGRESS NOTES
mouth 2 times daily    omeprazole (PRILOSEC) 20 MG delayed release capsule 1 capsule nightly    ondansetron (ZOFRAN) 4 MG tablet Take 1 tablet by mouth every 8 hours as needed    SUMAtriptan (IMITREX) 100 MG tablet TAKE 1 TABLET BY MOUTH AS NEEDED FOR MIGRAINE FOR UP TO 1 DOSE    Rimegepant Sulfate (NURTEC) 75 MG TBDP Take 75 mg by mouth every other day (Patient not taking: Reported on 11/14/2023)     No current facility-administered medications for this visit. Allergies   Allergen Reactions    Latex Anaphylaxis, Hives and Swelling     RASH and THROAT SWELLING    Garlic Cough, Hives and Other (See Comments)     Reaction Type: Allergy; Reaction(s): coughing, rash    Sulfa Antibiotics Swelling     Other reaction(s): Unknown (comments)  Eyes swell shut      Penicillins Swelling     Other reaction(s): Unknown (comments)  Eyes swell shut        Past Medical History:   Diagnosis Date    Anxiety disorder     Depression     Diabetes ([address])     Headache     Hypertension      Past Surgical History:   Procedure Laterality Date    BUNIONECTOMY Bilateral     CHOLECYSTECTOMY      GI      KNEE ARTHROSCOPY Left     ORTHOPEDIC SURGERY      TUBAL LIGATION          reports that she quit smoking about 2 years ago. Her smoking use included cigarettes. She smoked an average of .5 packs per day. She has never used smokeless tobacco. She reports current alcohol use. She reports that she does not use drugs. family history includes Cancer in her mother. Review of Systems      Objective:          No data to display                 General:  Well defined, nourished, and groomed individual in no acute distress. Psych:  Good mood and bright affect    NEUROLOGICAL EXAMINATION:     Mental Status:   Alert and oriented to person, place, and time with recent and remote memory intact. Attention span and concentration are normal. Speech is fluent with a full fund of knowledge.       Cranial Nerves:  I: smell Not tested   II: visual fields

## 2024-02-12 ENCOUNTER — TELEPHONE (OUTPATIENT)
Age: 49
End: 2024-02-12

## 2024-02-13 ENCOUNTER — TELEMEDICINE (OUTPATIENT)
Age: 49
End: 2024-02-13
Payer: COMMERCIAL

## 2024-02-13 DIAGNOSIS — G43.719 CHRONIC MIGRAINE WITHOUT AURA, INTRACTABLE, WITHOUT STATUS MIGRAINOSUS: Primary | ICD-10-CM

## 2024-02-13 DIAGNOSIS — G47.26 SHIFTING SLEEP-WORK SCHEDULE, AFFECTING SLEEP: ICD-10-CM

## 2024-02-13 PROCEDURE — 99214 OFFICE O/P EST MOD 30 MIN: CPT | Performed by: NURSE PRACTITIONER

## 2024-02-13 RX ORDER — ZALEPLON 10 MG/1
20 CAPSULE ORAL NIGHTLY PRN
Qty: 60 CAPSULE | Refills: 2 | Status: SHIPPED | OUTPATIENT
Start: 2024-02-13 | End: 2024-05-13

## 2024-02-13 RX ORDER — ERENUMAB-AOOE 140 MG/ML
140 INJECTION, SOLUTION SUBCUTANEOUS
Qty: 1 ML | Refills: 5 | Status: SHIPPED | OUTPATIENT
Start: 2024-02-13

## 2024-02-13 RX ORDER — RIMEGEPANT SULFATE 75 MG/75MG
75 TABLET, ORALLY DISINTEGRATING ORAL EVERY OTHER DAY
Qty: 8 TABLET | Refills: 5 | Status: SHIPPED | OUTPATIENT
Start: 2024-02-13

## 2024-05-14 ENCOUNTER — TELEMEDICINE (OUTPATIENT)
Age: 49
End: 2024-05-14
Payer: COMMERCIAL

## 2024-05-14 DIAGNOSIS — G47.26 SHIFTING SLEEP-WORK SCHEDULE, AFFECTING SLEEP: ICD-10-CM

## 2024-05-14 DIAGNOSIS — G43.719 CHRONIC MIGRAINE WITHOUT AURA, INTRACTABLE, WITHOUT STATUS MIGRAINOSUS: Primary | ICD-10-CM

## 2024-05-14 PROCEDURE — 99214 OFFICE O/P EST MOD 30 MIN: CPT | Performed by: NURSE PRACTITIONER

## 2024-05-14 RX ORDER — ERENUMAB-AOOE 140 MG/ML
140 INJECTION, SOLUTION SUBCUTANEOUS
Qty: 1 ADJUSTABLE DOSE PRE-FILLED PEN SYRINGE | Refills: 3 | Status: SHIPPED | OUTPATIENT
Start: 2024-05-14

## 2024-05-14 RX ORDER — ZALEPLON 10 MG/1
20 CAPSULE ORAL NIGHTLY PRN
Qty: 60 CAPSULE | Refills: 2 | Status: SHIPPED | OUTPATIENT
Start: 2024-05-14 | End: 2024-08-12

## 2024-05-14 RX ORDER — ERENUMAB-AOOE 140 MG/ML
140 INJECTION, SOLUTION SUBCUTANEOUS
Qty: 1 ADJUSTABLE DOSE PRE-FILLED PEN SYRINGE | Refills: 3 | Status: SHIPPED | OUTPATIENT
Start: 2024-05-14 | End: 2024-05-14

## 2024-05-14 NOTE — PROGRESS NOTES
reports current alcohol use. She reports that she does not use drugs.  family history includes Cancer in her mother.     Review of Systems    Objective:          No data to display               General:  Well defined, nourished, and groomed individual in no acute distress.    Psych:  Good mood and bright affect    NEUROLOGICAL EXAMINATION:     Mental Status:   Alert and oriented to person, place, and time with recent and remote memory intact.  Attention span and concentration are normal. Speech is fluent with a full fund of knowledge.      Cranial Nerves:  I: smell Not tested   II: visual fields Not assessed   II: pupils Equal, round, reactive to light   II: optic disc Not assessed   III,VII: ptosis none   III,IV,VI: extraocular muscles  Full ROM   V: mastication normal   V: facial light touch sensation  Not assessed   VII: facial muscle function   symmetric   VIII: hearing symmetric   IX: soft palate elevation  normal   XI: trapezius strength  Not assessed   XI: sternocleidomastoid strength Not assessed   XI: neck flexion strength  Not assessed   XII: tongue  midline     Motor Examination: Normal tone and bulk.  Strength was not assessed      Sensory exam:  Not assessed     Coordination:  No resting or intention tremor    Gait and Station:  No muscle wasting or fasiculations noted.      Reflexes:  Not assessed    Assessment & Plan:      Diagnosis Orders   1. Chronic migraine without aura, intractable, without status migrainosus  Erenumab-aooe (AIMOVIG) 140 MG/ML SOAJ    DISCONTINUED: Erenumab-aooe (AIMOVIG) 140 MG/ML SOAJ      2. Shifting sleep-work schedule, affecting sleep  zaleplon (SONATA) 10 MG capsule        At last visit, the Aimovig was to be increased for effect but unfortunately, a new PA was never done resulting in her not having either the 70mg dose or the 140mg dose. As she has not had any preventative, the migraines are now near daily and in the last couple of months she has noted an increase in

## 2024-05-21 ENCOUNTER — TELEPHONE (OUTPATIENT)
Age: 49
End: 2024-05-21

## 2024-05-21 NOTE — TELEPHONE ENCOUNTER
----- Message from Maddy Chino sent at 5/21/2024  9:22 AM EDT -----  Regarding: Medication  Contact: [phone]  I am able to get my Nurtec but not my amiovig that one is over $700

## 2024-05-29 ENCOUNTER — TELEPHONE (OUTPATIENT)
Age: 49
End: 2024-05-29

## 2024-05-29 NOTE — TELEPHONE ENCOUNTER
Aimovig sent to [facility] via Alexis [employer]  Key [id number]    Approved:  PA Case: [id number], Status: Approved, Coverage Starts on: 5/29/2024 12:00:00 AM, Coverage Ends on: 5/29/2025     Rt fax to [facility]  [phone]     Sending [employer] message to pt with the links to Aimovig and Nurtec savings card programs.     She has commercial insurance and should not be paying any copay for Nurtec.     Aimovig may be adjusted, but she can use link to activate it and show her pharmacy.

## 2024-08-27 DIAGNOSIS — G47.26 SHIFTING SLEEP-WORK SCHEDULE, AFFECTING SLEEP: ICD-10-CM

## 2024-08-29 RX ORDER — ZALEPLON 10 MG/1
CAPSULE ORAL
Qty: 60 CAPSULE | Refills: 2 | Status: SHIPPED | OUTPATIENT
Start: 2024-08-29 | End: 2024-11-27

## 2024-12-02 DIAGNOSIS — G47.26 SHIFTING SLEEP-WORK SCHEDULE, AFFECTING SLEEP: ICD-10-CM

## 2024-12-04 RX ORDER — ZALEPLON 10 MG/1
CAPSULE ORAL
Qty: 60 CAPSULE | Refills: 0 | Status: SHIPPED | OUTPATIENT
Start: 2024-12-04 | End: 2025-03-04

## 2024-12-11 ENCOUNTER — TELEPHONE (OUTPATIENT)
Age: 49
End: 2024-12-11

## 2024-12-11 NOTE — TELEPHONE ENCOUNTER
Nurtec - 8 per 30 for acute treatment.     Had to call in  to [facility] at [phone] as CMM showed final decision has been reached?    Previous trials of triptans:   Sumatriptan, rizatriptan    PA renewal (previous auth  last month)    Requested urgent status - in 36 hours.     Case [id number]  Right Fax to [phone] - faxed office visit notes

## 2024-12-13 ENCOUNTER — TELEPHONE (OUTPATIENT)
Age: 49
End: 2024-12-13

## 2024-12-13 NOTE — TELEPHONE ENCOUNTER
Nurtec approval     [id number]  Authorized from December 11, 2024 to December 11, 2025    Letter in Media

## 2025-01-22 ENCOUNTER — TELEMEDICINE (OUTPATIENT)
Age: 50
End: 2025-01-22
Payer: COMMERCIAL

## 2025-01-22 DIAGNOSIS — G47.26 SHIFTING SLEEP-WORK SCHEDULE, AFFECTING SLEEP: ICD-10-CM

## 2025-01-22 DIAGNOSIS — G43.719 CHRONIC MIGRAINE WITHOUT AURA, INTRACTABLE, WITHOUT STATUS MIGRAINOSUS: ICD-10-CM

## 2025-01-22 PROCEDURE — 99214 OFFICE O/P EST MOD 30 MIN: CPT | Performed by: NURSE PRACTITIONER

## 2025-01-22 RX ORDER — HYDROXYZINE HYDROCHLORIDE 50 MG/1
TABLET, FILM COATED ORAL
COMMUNITY
Start: 2024-07-01

## 2025-01-22 RX ORDER — METHOCARBAMOL 750 MG/1
1 TABLET, FILM COATED ORAL 3 TIMES DAILY PRN
COMMUNITY
Start: 2023-12-22

## 2025-01-22 RX ORDER — ERENUMAB-AOOE 140 MG/ML
140 INJECTION, SOLUTION SUBCUTANEOUS
Qty: 1 ADJUSTABLE DOSE PRE-FILLED PEN SYRINGE | Refills: 3 | Status: ACTIVE | OUTPATIENT
Start: 2025-01-22

## 2025-01-22 RX ORDER — DULOXETINE HYDROCHLORIDE 30 MG/1
CAPSULE, DELAYED RELEASE ORAL
COMMUNITY
Start: 2025-01-14

## 2025-01-22 RX ORDER — MELOXICAM 15 MG/1
TABLET ORAL
COMMUNITY
Start: 2023-12-22

## 2025-01-22 RX ORDER — TIRZEPATIDE 2.5 MG/.5ML
INJECTION, SOLUTION SUBCUTANEOUS
COMMUNITY

## 2025-01-22 RX ORDER — AMLODIPINE BESYLATE 5 MG/1
1 TABLET ORAL DAILY
COMMUNITY
Start: 2024-11-04

## 2025-01-22 RX ORDER — ALBUTEROL SULFATE 90 UG/1
2 INHALANT RESPIRATORY (INHALATION) EVERY 4 HOURS PRN
COMMUNITY
Start: 2023-12-22

## 2025-01-22 RX ORDER — RIMEGEPANT SULFATE 75 MG/75MG
75 TABLET, ORALLY DISINTEGRATING ORAL EVERY OTHER DAY
Qty: 8 TABLET | Refills: 5 | Status: ACTIVE | OUTPATIENT
Start: 2025-01-22

## 2025-01-22 RX ORDER — LEVOTHYROXINE SODIUM 50 UG/1
1 TABLET ORAL EVERY MORNING
COMMUNITY
Start: 2023-12-22

## 2025-01-22 RX ORDER — ERGOCALCIFEROL 1.25 MG/1
50000 CAPSULE, LIQUID FILLED ORAL WEEKLY
COMMUNITY

## 2025-01-22 RX ORDER — OMEPRAZOLE 40 MG/1
CAPSULE, DELAYED RELEASE ORAL
COMMUNITY
Start: 2025-01-14

## 2025-01-22 RX ORDER — ZALEPLON 10 MG/1
10 CAPSULE ORAL
Qty: 60 CAPSULE | Refills: 1 | Status: SHIPPED | OUTPATIENT
Start: 2025-01-22 | End: 2025-05-22

## 2025-01-22 NOTE — PROGRESS NOTES
[facility]  NEUROLOGY CLINIC   [address]   [place] [address]   [phone] Office   [phone] Fax           Date:  25     Name:  MADDY CHINO  :  1975  MRN:  [id number]     PCP:  Josselin Couch APRN - NP    Maddy Chino is a 49 y.o. female who was seen by synchronous (real-time) audio-video technology on 2025 for Migraine    Subjective:   She indicates that the Aimovig 140mg was approved but it still costs her $600, so she has not been able to get this. She still has the Nurtec which she has been using at least twice a week and she does run out of this each month. She has had some days that she is not able to push through. These are the days she does not have the Nurtec. She is still having 15 migraines per month.     Migraine medication history: Emaglity, sumatriptan, escitalopram, topiramate, rizatriptan, Nurtec     Recap from LV:  Chronic migraine headaches with improvement on Aimovig 70 mg with subsequent increase to 140mg monthly for affect.  Unfortunately, she had a current prior authorization for the 70 mg but not 1 for the 140 mg dose.  She is presently on SSRI for anxiety and depression so would not add additional antidepressant medication.She is already currently on metoprolol which has not provided any benefit with regard to migraine prevention.  Again, she has done well with Aimovig 140mg monthly and we would like to continue with this as she did see a reduction from 2-3 migraines weekly only 1 or 2 migraines monthly which have been easily aborted with Nurtec for acute management.  She has been on both Imitrex and Maxalt neither of which have been effective.  Continue with Nurtec for acute management.  Shiftwork sleep disorder has been manageable with Sonata 20 mg as needed.  Follow-up in 3 months.     Current Outpatient Medications   Medication Sig    CYMBALTA 30 MG extended release

## 2025-03-25 ENCOUNTER — PATIENT MESSAGE (OUTPATIENT)
Age: 50
End: 2025-03-25

## 2025-03-25 DIAGNOSIS — T39.95XA ANALGESIC REBOUND HEADACHE: ICD-10-CM

## 2025-03-25 DIAGNOSIS — G44.40 ANALGESIC REBOUND HEADACHE: ICD-10-CM

## 2025-03-25 DIAGNOSIS — G43.719 CHRONIC MIGRAINE WITHOUT AURA, INTRACTABLE, WITHOUT STATUS MIGRAINOSUS: Primary | ICD-10-CM

## 2025-03-25 RX ORDER — PREDNISONE 10 MG/1
TABLET ORAL
Qty: 42 TABLET | Refills: 0 | Status: SHIPPED | OUTPATIENT
Start: 2025-03-25

## 2025-04-23 ENCOUNTER — TELEPHONE (OUTPATIENT)
Age: 50
End: 2025-04-23

## 2025-04-23 ENCOUNTER — TELEMEDICINE (OUTPATIENT)
Age: 50
End: 2025-04-23
Payer: COMMERCIAL

## 2025-04-23 DIAGNOSIS — G43.719 CHRONIC MIGRAINE WITHOUT AURA, INTRACTABLE, WITHOUT STATUS MIGRAINOSUS: ICD-10-CM

## 2025-04-23 PROCEDURE — 99214 OFFICE O/P EST MOD 30 MIN: CPT | Performed by: NURSE PRACTITIONER

## 2025-04-23 RX ORDER — ERENUMAB-AOOE 140 MG/ML
140 INJECTION, SOLUTION SUBCUTANEOUS
Qty: 1 ADJUSTABLE DOSE PRE-FILLED PEN SYRINGE | Refills: 3 | Status: SHIPPED | OUTPATIENT
Start: 2025-04-23

## 2025-04-23 RX ORDER — RIMEGEPANT SULFATE 75 MG/75MG
75 TABLET, ORALLY DISINTEGRATING ORAL PRN
Qty: 8 TABLET | Refills: 5 | Status: ACTIVE | OUTPATIENT
Start: 2025-04-23

## 2025-04-23 NOTE — PROGRESS NOTES
Anxiety disorder     Depression     Diabetes (HCC)     Headache     Hypertension      Past Surgical History:   Procedure Laterality Date    BUNIONECTOMY Bilateral     CHOLECYSTECTOMY      GI      KNEE ARTHROSCOPY Left     ORTHOPEDIC SURGERY      TUBAL LIGATION        reports that she quit smoking about 4 years ago. Her smoking use included cigarettes. She has never used smokeless tobacco. She reports current alcohol use. She reports that she does not use drugs.  family history includes Cancer in her mother.     Review of Systems    Objective:          No data to display               General:  Well defined, nourished, and groomed individual in no acute distress.    Psych:  Good mood and bright affect    NEUROLOGICAL EXAMINATION:     Mental Status:   Alert and oriented to person, place, and time with recent and remote memory intact.  Attention span and concentration are normal. Speech is fluent with a full fund of knowledge.      Cranial Nerves:  I: smell Not tested   II: visual fields Not assessed   II: pupils Equal, round, reactive to light   II: optic disc Not assessed   III,VII: ptosis none   III,IV,VI: extraocular muscles  Full ROM   V: mastication normal   V: facial light touch sensation  Not assessed   VII: facial muscle function   symmetric   VIII: hearing symmetric   IX: soft palate elevation  normal   XI: trapezius strength  Not assessed   XI: sternocleidomastoid strength Not assessed   XI: neck flexion strength  Not assessed   XII: tongue  midline     Motor Examination: Normal tone and bulk.  Strength was not assessed      Sensory exam:  Not assessed     Coordination:  No resting or intention tremor    Gait and Station:  No muscle wasting or fasiculations noted.      Reflexes:  Not assessed    Assessment & Plan:      Diagnosis Orders   1. Chronic migraine without aura, intractable, without status migrainosus  Erenumab-aooe (AIMOVIG) 140 MG/ML SOAJ    rimegepant sulfate (NURTEC) 75 MG TBDP        At last

## 2025-04-23 NOTE — TELEPHONE ENCOUNTER
Re: Aimovig - there is an approval letter scanned in 10-7-24 shows approved to 5/29/25.      Rx wrote new Rx for it today.     However, patient has not been able to .     Please confirm.

## 2025-06-23 ENCOUNTER — TELEMEDICINE (OUTPATIENT)
Age: 50
End: 2025-06-23
Payer: COMMERCIAL

## 2025-06-23 DIAGNOSIS — G43.719 CHRONIC MIGRAINE WITHOUT AURA, INTRACTABLE, WITHOUT STATUS MIGRAINOSUS: Primary | ICD-10-CM

## 2025-06-23 DIAGNOSIS — M79.605 LEFT LEG PAIN: ICD-10-CM

## 2025-06-23 PROCEDURE — 99214 OFFICE O/P EST MOD 30 MIN: CPT | Performed by: NURSE PRACTITIONER

## 2025-06-23 RX ORDER — FLUTICASONE FUROATE, UMECLIDINIUM BROMIDE AND VILANTEROL TRIFENATATE 100; 62.5; 25 UG/1; UG/1; UG/1
POWDER RESPIRATORY (INHALATION)
COMMUNITY
Start: 2025-03-21

## 2025-06-23 RX ORDER — ONDANSETRON 4 MG/1
4 TABLET, FILM COATED ORAL EVERY 8 HOURS PRN
Qty: 30 TABLET | Refills: 1 | Status: SHIPPED | OUTPATIENT
Start: 2025-06-23

## 2025-06-23 RX ORDER — ERENUMAB-AOOE 140 MG/ML
140 INJECTION, SOLUTION SUBCUTANEOUS
Qty: 1 ADJUSTABLE DOSE PRE-FILLED PEN SYRINGE | Refills: 3 | Status: ACTIVE | OUTPATIENT
Start: 2025-06-23

## 2025-06-23 RX ORDER — RIMEGEPANT SULFATE 75 MG/75MG
75 TABLET, ORALLY DISINTEGRATING ORAL PRN
Qty: 8 TABLET | Refills: 5 | Status: ACTIVE | OUTPATIENT
Start: 2025-06-23

## 2025-06-23 NOTE — PROGRESS NOTES
[facility]  [place] [facility]  NEUROLOGY CLINIC   [address] [facility] [address] [place] [address]   [phone] Office   [phone] Fax           Date:  25     Name:  MADDY CHINO  :  1975  MRN:  [id number]     PCP:  Josselin Couch APRN - NP    Maddy Chino is a 50 y.o. female who was seen by synchronous (real-time) audio-video technology on 2025 for Migraine    Subjective:   Finally was able to start the Aimovig 140mg monthly. She has had two injections, the last being last week. She is still having migraines a couple of times per week which are more manageable for the most part with the Nurtec.  There are a couple that are still refractory.     Has complaints of left leg pain without any injury. It is swollen and feels like her calf is cramping. She has had DVTs in the past.     Migraine medication history: Emaglity, sumatriptan, escitalopram, topiramate, rizatriptan, Nurtec     Recap from LV:  At last visit, the Aimovig was to be increased for effect but unfortunately, she still has not been able to start this. As she has not had any preventative, the migraines are now occurring at least 15 days per month. The Nurtec does help with acute management but she does run out of this each mongh. Will try to restart this today and if the migraines improve. She has been on topamax and emgality in the past which were not effective. She is presently on SSRI for anxiety and depression so would not add additional antidepressant medication.She is already currently on metoprolol which has not provided any benefit with regard to migraine prevention. Follow up in three months    Current Outpatient Medications   Medication Sig    TRELEGY ELLIPTA 100-62.5-25 MCG/ACT AEPB inhaler INHALE 1 PUFF ONCE DAILY    Erenumab-aooe (AIMOVIG) 140 MG/ML SOAJ Inject 140 mg into the skin every 30 days    rimegepant sulfate (NURTEC) 75 MG TBDP Take 75 mg by mouth as